# Patient Record
Sex: MALE | Race: BLACK OR AFRICAN AMERICAN | NOT HISPANIC OR LATINO | Employment: FULL TIME | URBAN - METROPOLITAN AREA
[De-identification: names, ages, dates, MRNs, and addresses within clinical notes are randomized per-mention and may not be internally consistent; named-entity substitution may affect disease eponyms.]

---

## 2017-06-06 ENCOUNTER — THERAPY VISIT (OUTPATIENT)
Dept: PHYSICAL THERAPY | Facility: CLINIC | Age: 57
End: 2017-06-06
Payer: OTHER MISCELLANEOUS

## 2017-06-06 DIAGNOSIS — M76.822 POSTERIOR TIBIAL TENDON DYSFUNCTION (PTTD) OF LEFT LOWER EXTREMITY: Primary | ICD-10-CM

## 2017-06-06 PROCEDURE — 97035 APP MDLTY 1+ULTRASOUND EA 15: CPT | Mod: GP | Performed by: PHYSICAL THERAPIST

## 2017-06-06 PROCEDURE — 97110 THERAPEUTIC EXERCISES: CPT | Mod: GP | Performed by: PHYSICAL THERAPIST

## 2017-06-06 PROCEDURE — 97161 PT EVAL LOW COMPLEX 20 MIN: CPT | Mod: GP | Performed by: PHYSICAL THERAPIST

## 2017-06-06 ASSESSMENT — ACTIVITIES OF DAILY LIVING (ADL)
HOW_WOULD_YOU_RATE_THE_OVERALL_FUNCTION_OF_YOUR_KNEE_DURING_YOUR_USUAL_DAILY_ACTIVITIES?: ABNORMAL
RISE FROM A CHAIR: ACTIVITY IS VERY DIFFICULT
SWELLING: THE SYMPTOM AFFECTS MY ACTIVITY SEVERELY
STAND: ACTIVITY IS FAIRLY DIFFICULT
KNEEL ON THE FRONT OF YOUR KNEE: ACTIVITY IS VERY DIFFICULT
GO DOWN STAIRS: ACTIVITY IS VERY DIFFICULT
HOW_WOULD_YOU_RATE_THE_CURRENT_FUNCTION_OF_YOUR_KNEE_DURING_YOUR_USUAL_DAILY_ACTIVITIES_ON_A_SCALE_FROM_0_TO_100_WITH_100_BEING_YOUR_LEVEL_OF_KNEE_FUNCTION_PRIOR_TO_YOUR_INJURY_AND_0_BEING_THE_INABILITY_TO_PERFORM_ANY_OF_YOUR_USUAL_DAILY_ACTIVITIES?: 20
AS_A_RESULT_OF_YOUR_KNEE_INJURY,_HOW_WOULD_YOU_RATE_YOUR_CURRENT_LEVEL_OF_DAILY_ACTIVITY?: ABNORMAL
SIT WITH YOUR KNEE BENT: ACTIVITY IS VERY DIFFICULT
WEAKNESS: NOT ANSWERED
STIFFNESS: THE SYMPTOM AFFECTS MY ACTIVITY MODERATELY
WALK: ACTIVITY IS FAIRLY DIFFICULT
GIVING WAY, BUCKLING OR SHIFTING OF KNEE: I DO NOT HAVE THE SYMPTOM
SQUAT: ACTIVITY IS VERY DIFFICULT
GO UP STAIRS: ACTIVITY IS VERY DIFFICULT
LIMPING: THE SYMPTOM AFFECTS MY ACTIVITY SLIGHTLY

## 2017-06-06 NOTE — PROGRESS NOTES
Subjective:    Patient is a 56 year old male presenting with rehab left ankle/foot hpi.   Aleks Gilmore is a 56 year old male with a left foot and left ankle condition.  Occurance: CARRYING MAIL IN 2013.  FOLLOWED BY SURGERY.   Condition occurred: at work.  This is a chronic condition  12/27/2013.     DP: 5/30/2017    WEARING FOOT ORTHOSES AND NEW SHOES AND THIS IS HELPING. .    Patient reports pain:  Medial.    Pain is described as aching and is intermittent and reported as 7/10.  Associated symptoms:  Buckling/giving out, tingling, loss of motion/stiffness and loss of strength. Pain is worse in the P.M..  Symptoms are exacerbated by ascending stairs, descending stairs, transfers and walking Relieved by: NEW SHOES, FOOT ORTHOSES.   Pain course: WAS WORSE, NOW IMPROVED.   Special tests:  MRI.  Previous treatment includes physical therapy.  There was no improvement following previous treatment.  General health as reported by patient is good.  Pertinent medical history includes:  Osteoarthritis, cancer, overweight, implanted device and history of fractures.    Other surgeries include:  Orthopedic surgery, other and cancer surgery (PROSTATE).  Current medications:  Anti-inflammatory.  Current occupation is  [employer]. .  Patient is working in normal job without restrictions.  Primary job tasks include:  Prolonged standing, lifting and repetitive tasks.    Barriers include:  None as reported by patient.    Red flags:  None as reported by patient.                        Objective:    System    Ankle/Foot Evaluation  ROM:        Strength:      Plantarflexion: Left: 3+/5   Pain:   Right: 5/5  Pain:  Inversion:Left: 4+/5  Pain:     Right: 5/5  Pain:  Eversion:Left: 5/5  Pain:  Right: 5/5  Pain:        Posterior Tibialis: Left: 4+/5  Pain:  Right: 5/5  Pain:  Peroneals: Left: 5/5  Pain:  Right: 5/5  Pain:        LIGAMENT TESTING:   Anterior Drawer (ATF) Left: neg   Anterior Drawer (ATF) Right: neg  Posterior  Drawer (PTF) Left: neg   Posterior Drawer (PTF) Right: neg  Varus Stress (Calc Fib) Left: neg    Varus Stress (Calc Fib) Right: neg  Valgus Stress (Deltoid) Left: neg    Valgus Stress (Deltoid) Right: neg  Rotation (Deltoid) Left: neg      External Rotation (High Ankle) Left: neg         PALPATION:   Left ankle tenderness present at:  posterior tibialis  Right ankle tenderness present at:   posterior tibialis                                                        General     ROS    Assessment/Plan:      Patient is a 56 year old male with left side ankle complaints.    Patient has the following significant findings with corresponding treatment plan.                Diagnosis 1:  POSTERIOR TIBIALIS TENDON DYSFUNCTION, INSERTIONAL LEFT.   Pain -  hot/cold therapy, US, electric stimulation, manual therapy, splint/taping/bracing/orthotics, self management, education, directional preference exercise and home program  Decreased strength - therapeutic exercise, therapeutic activities and home program  Decreased function - therapeutic activities and home program    Therapy Evaluation Codes:   1) History comprised of:   Personal factors that impact the plan of care:      None.    Comorbidity factors that impact the plan of care are:      Overweight and HISTORY OF SURGERY TO SITE. .     Medications impacting care: None.  2) Examination of Body Systems comprised of:   Body structures and functions that impact the plan of care:      Ankle, Hip and Knee.   Activity limitations that impact the plan of care are:      Stairs, Walking and Working.  3) Clinical presentation characteristics are:   Stable/Uncomplicated.  4) Decision-Making    Low complexity using standardized patient assessment instrument and/or measureable assessment of functional outcome.  Cumulative Therapy Evaluation is: Low complexity.    Previous and current functional limitations:  (See Goal Flow Sheet for this information)    Short term and Long term goals: (See  Goal Flow Sheet for this information)     Communication ability:  Patient appears to be able to clearly communicate and understand verbal and written communication and follow directions correctly.  Treatment Explanation - The following has been discussed with the patient:   RX ordered/plan of care  Anticipated outcomes  Possible risks and side effects  This patient would benefit from PT intervention to resume normal activities.   Rehab potential is fair.    Frequency:  1 X week, once daily  Duration:  for 6  E+T weeks  Discharge Plan:  Achieve all LTG.  Independent in home treatment program.  Reach maximal therapeutic benefit.    Please refer to the daily flowsheet for treatment today, total treatment time and time spent performing 1:1 timed codes.

## 2017-06-06 NOTE — MR AVS SNAPSHOT
"              After Visit Summary   2017    Aleks Gilmore    MRN: [id number]           Patient Information     Date Of Birth          1960        Visit Information        Provider Department      2017 2:40 PM Stone Rucker PT [facility]        Today's Diagnoses     Posterior tibial tendon dysfunction (PTTD) of left lower extremity    -  1       Follow-ups after your visit        Who to contact     If you have questions or need follow up information about today's clinic visit or your schedule please contact [facility] directly at [phone].  Normal or non-critical lab and imaging results will be communicated to you by [employer], letter or phone within 4 business days after the clinic has received the results. If you do not hear from us within 7 days, please contact the clinic through [employer] or phone. If you have a critical or abnormal lab result, we will notify you by phone as soon as possible.  Submit refill requests through [employer] or call your pharmacy and they will forward the refill request to us. Please allow 3 business days for your refill to be completed.          Additional Information About Your Visit        MyChart Information     [employer]bler lets you send messages to your doctor, view your test results, renew your prescriptions, schedule appointments and more. To sign up, go to [email or web address] . Click on \"Log in\" on the left side of the screen, which will take you to the Welcome page. Then click on \"Sign up Now\" on the right side of the page.     You will be asked to enter the access code listed below, as well as some personal information. Please follow the directions to create your username and password.     Your access code is: [email or web address]  Expires: 2017  9:52 PM     Your access code will  in 90 days. If you need help or a new code, please call your [place] clinic or [phone].        Care " EveryWhere ID     This is your Care EveryWhere ID. This could be used by other organizations to access your [place] medical records  [id number]         Blood Pressure from Last 3 Encounters:   No data found for BP    Weight from Last 3 Encounters:   No data found for Wt              We Performed the Following     PABLITO Inital Eval Report     PT Eval, Low Complexity (50205)     Therapeutic Exercises     Ultrasound Therapy        Primary Care Provider    None Specified       No primary provider on file.        Thank you!     Thank you for choosing [place] [facility]  for your care. Our goal is always to provide you with excellent care. Hearing back from our patients is one way we can continue to improve our services. Please take a few minutes to complete the written survey that you may receive in the mail after your visit with us. Thank you!             Your Updated Medication List - Protect others around you: Learn how to safely use, store and throw away your medicines at www.disposemymeds.org.      Notice  As of 6/6/2017 11:59 PM    You have not been prescribed any medications.

## 2017-06-07 PROBLEM — M76.822 POSTERIOR TIBIAL TENDON DYSFUNCTION (PTTD) OF LEFT LOWER EXTREMITY: Status: ACTIVE | Noted: 2017-06-07

## 2017-06-12 ENCOUNTER — THERAPY VISIT (OUTPATIENT)
Dept: PHYSICAL THERAPY | Facility: CLINIC | Age: 57
End: 2017-06-12
Payer: OTHER MISCELLANEOUS

## 2017-06-12 DIAGNOSIS — M76.822 POSTERIOR TIBIAL TENDON DYSFUNCTION (PTTD) OF LEFT LOWER EXTREMITY: ICD-10-CM

## 2017-06-12 PROCEDURE — 97112 NEUROMUSCULAR REEDUCATION: CPT | Mod: GP | Performed by: PHYSICAL THERAPIST

## 2017-06-12 PROCEDURE — 97110 THERAPEUTIC EXERCISES: CPT | Mod: GP | Performed by: PHYSICAL THERAPIST

## 2017-06-12 PROCEDURE — 97035 APP MDLTY 1+ULTRASOUND EA 15: CPT | Mod: GP | Performed by: PHYSICAL THERAPIST

## 2017-06-12 NOTE — PROGRESS NOTES
Subjective:    HPI                    Objective:    System    Physical Exam    General     ROS    Assessment/Plan:      SUBJECTIVE  Subjective changes as noted by pt:  Pt was seen for initial eval at [facility].  He reports that he got 1 day of relief from US and exercises.  He has been using old orthotics and feels that this has also been helpful as he waits for his new orthotics to come.       Current pain level:  Current Pain level: 4/10   Changes in function:  None     Adverse reaction to treatment or activity:  None    OBJECTIVE  Changes in objective findings:  Pt tolerated ankle strengthening with good tolerance.  Continued with US to posterior tibialis.  Added stretching and proprioceptive training.        ASSESSMENT  Aleks continues to require intervention to meet STG and LTG's: PT  Patient is progressing as expected.  Progress made towards STG/LTG?  None    PLAN  Current treatment program is being advanced to more complex exercises.    PTA/ATC plan:  N/A    Please refer to the daily flowsheet for treatment today, total treatment time and time spent performing 1:1 timed codes.

## 2017-06-12 NOTE — MR AVS SNAPSHOT
"              After Visit Summary   2017    Aleks Gilmore    MRN: [id number]           Patient Information     Date Of Birth          1960        Visit Information        Provider Department      2017 11:20 AM Obie Mota PT [facility]        Today's Diagnoses     Posterior tibial tendon dysfunction (PTTD) of left lower extremity           Follow-ups after your visit        Who to contact     If you have questions or need follow up information about today's clinic visit or your schedule please contact [facility] directly at [phone].  Normal or non-critical lab and imaging results will be communicated to you by "[employer]hart, letter or phone within 4 business days after the clinic has received the results. If you do not hear from us within 7 days, please contact the clinic through "[employer]hart or phone. If you have a critical or abnormal lab result, we will notify you by phone as soon as possible.  Submit refill requests through [place] [employer] or call your pharmacy and they will forward the refill request to us. Please allow 3 business days for your refill to be completed.          Additional Information About Your Visit        MyChart Information     [place] [employer] lets you send messages to your doctor, view your test results, renew your prescriptions, schedule appointments and more. To sign up, go to [email or web address] . Click on \"Log in\" on the left side of the screen, which will take you to the Welcome page. Then click on \"Sign up Now\" on the right side of the page.     You will be asked to enter the access code listed below, as well as some personal information. Please follow the directions to create your username and password.     Your access code is: [email or web address]  Expires: 2017  9:52 PM     Your access code will  in 90 days. If you need help or a new code, please call your [place] " clinic or [phone].        Care EveryWhere ID     This is your Care EveryWhere ID. This could be used by other organizations to access your [place] medical records  [id number]         Blood Pressure from Last 3 Encounters:   No data found for BP    Weight from Last 3 Encounters:   No data found for Wt              We Performed the Following     NEUROMUSCULAR RE-EDUCATION     THERAPEUTIC EXERCISES     ULTRASOUND THERAPY        Primary Care Provider    None Specified       No primary provider on file.        Thank you!     Thank you for choosing [place] [facility] PHYSICAL [facility]  for your care. Our goal is always to provide you with excellent care. Hearing back from our patients is one way we can continue to improve our services. Please take a few minutes to complete the written survey that you may receive in the mail after your visit with us. Thank you!             Your Updated Medication List - Protect others around you: Learn how to safely use, store and throw away your medicines at www.disposemymeds.org.      Notice  As of 6/12/2017  4:44 PM    You have not been prescribed any medications.

## 2017-06-21 ENCOUNTER — THERAPY VISIT (OUTPATIENT)
Dept: PHYSICAL THERAPY | Facility: CLINIC | Age: 57
End: 2017-06-21
Payer: OTHER MISCELLANEOUS

## 2017-06-21 DIAGNOSIS — M76.822 POSTERIOR TIBIAL TENDON DYSFUNCTION (PTTD) OF LEFT LOWER EXTREMITY: ICD-10-CM

## 2017-06-21 PROCEDURE — 97112 NEUROMUSCULAR REEDUCATION: CPT | Mod: GP | Performed by: PHYSICAL THERAPIST

## 2017-06-21 PROCEDURE — 97035 APP MDLTY 1+ULTRASOUND EA 15: CPT | Mod: GP | Performed by: PHYSICAL THERAPIST

## 2017-06-21 PROCEDURE — 97110 THERAPEUTIC EXERCISES: CPT | Mod: GP | Performed by: PHYSICAL THERAPIST

## 2017-06-21 NOTE — MR AVS SNAPSHOT
After Visit Summary   6/21/2017    Aleks Gilmore    MRN: [id number]           Patient Information     Date Of Birth          1960        Visit Information        Provider Department      6/21/2017 2:40 PM Obie Mota, PT [facility]        Today's Diagnoses     Posterior tibial tendon dysfunction (PTTD) of left lower extremity           Follow-ups after your visit        Your next 10 appointments already scheduled     Jun 27, 2017  2:30 PM CDT   PABLITO Extremity with Jean Marie Cooley [facility] Physical Therapy ([facility]  )    [address]  [place] [address]   [phone]            Jul 05, 2017  2:30 PM CDT   PABLITO Extremity with Jean Marie Cooley [facility] Physical Therapy ([facility]  )    [address]  [place] [address]   [phone]            Jul 12, 2017  2:40 PM CDT   PABLITO Extremity with Obie Mota, MARTÍN   [facility] Physical Therapy ([facility]  )    [address]  [place] [address]   [phone]              Who to contact     If you have questions or need follow up information about today's clinic visit or your schedule please contact [facility] PHYSICAL THERAPY directly at [phone].  Normal or non-critical lab and imaging results will be communicated to you by MyChart, letter or phone within 4 business days after the clinic has received the results. If you do not hear from us within 7 days, please contact the clinic through MyChart or phone. If you have a critical or abnormal lab result, we will notify you by phone as soon as possible.  Submit refill requests through [employer] or call your pharmacy and they will forward the refill request to us. Please allow 3 business  "days for your refill to be completed.          Additional Information About Your Visit        MyChart Information     [employer] lets you send messages to your doctor, view your test results, renew your prescriptions, schedule appointments and more. To sign up, go to [email or web address] . Click on \"Log in\" on the left side of the screen, which will take you to the Welcome page. Then click on \"Sign up Now\" on the right side of the page.     You will be asked to enter the access code listed below, as well as some personal information. Please follow the directions to create your username and password.     Your access code is: [email or web address]  Expires: 2017  9:52 PM     Your access code will  in 90 days. If you need help or a new code, please call your [place] clinic or [phone].        Care EveryWhere ID     This is your Care EveryWhere ID. This could be used by other organizations to access your [place] medical records  [id number]         Blood Pressure from Last 3 Encounters:   No data found for BP    Weight from Last 3 Encounters:   No data found for Wt              We Performed the Following     NEUROMUSCULAR RE-EDUCATION     THERAPEUTIC EXERCISES     ULTRASOUND THERAPY        Primary Care Provider    None Specified       No primary provider on file.        Equal Access to Services     ELZBIETA PEDERSON : Sunil Reich, amadou macario, jenn aiken, darcie dubose . So [facility] [phone].    ATENCIÓN: Si habla español, tiene a sutton disposición servicios gratuitos de asistencia lingüística. Llame al [phone].    We comply with applicable federal civil rights laws and [place] laws. We do not discriminate on the basis of race, color, national origin, age, disability sex, sexual orientation or gender identity.            Thank you!     Thank you for choosing [facility] THER[facility]  for your care. Our goal is " always to provide you with excellent care. Hearing back from our patients is one way we can continue to improve our services. Please take a few minutes to complete the written survey that you may receive in the mail after your visit with us. Thank you!             Your Updated Medication List - Protect others around you: Learn how to safely use, store and throw away your medicines at www.disposemymeds.org.      Notice  As of 6/21/2017 11:59 PM    You have not been prescribed any medications.

## 2017-06-26 NOTE — PROGRESS NOTES
Subjective:    HPI                    Objective:    System    Physical Exam    General     ROS    Assessment/Plan:      SUBJECTIVE  Subjective changes as noted by pt:  Pt reports that he continues to experience soreness and weakness.  He is attempting to to a little more walking/standing.     Current pain level:  Current Pain level: 4/10   Changes in function:  None     Adverse reaction to treatment or activity:  None    OBJECTIVE  Changes in objective findings:  Pt is improving with single leg stance tolerance.  Gait:  Decreased heel strike and push off persists.        ASSESSMENT  Aleks continues to require intervention to meet STG and LTG's: PT  Patient is progressing as expected.  Progress made towards STG/LTG?  None    PLAN  Continue current treatment plan until patient demonstrates readiness to progress to higher level exercises.    PTA/ATC plan:  N/A    Please refer to the daily flowsheet for treatment today, total treatment time and time spent performing 1:1 timed codes.

## 2017-06-27 ENCOUNTER — THERAPY VISIT (OUTPATIENT)
Dept: PHYSICAL THERAPY | Facility: CLINIC | Age: 57
End: 2017-06-27
Payer: OTHER MISCELLANEOUS

## 2017-06-27 DIAGNOSIS — M76.822 POSTERIOR TIBIAL TENDON DYSFUNCTION (PTTD) OF LEFT LOWER EXTREMITY: ICD-10-CM

## 2017-06-27 PROCEDURE — 97035 APP MDLTY 1+ULTRASOUND EA 15: CPT | Mod: GP | Performed by: SPECIALIST/TECHNOLOGIST

## 2017-06-27 PROCEDURE — 97110 THERAPEUTIC EXERCISES: CPT | Mod: GP | Performed by: SPECIALIST/TECHNOLOGIST

## 2017-06-27 PROCEDURE — 97112 NEUROMUSCULAR REEDUCATION: CPT | Mod: GP | Performed by: SPECIALIST/TECHNOLOGIST

## 2017-07-05 ENCOUNTER — THERAPY VISIT (OUTPATIENT)
Dept: PHYSICAL THERAPY | Facility: CLINIC | Age: 57
End: 2017-07-05
Payer: OTHER MISCELLANEOUS

## 2017-07-05 DIAGNOSIS — M76.822 POSTERIOR TIBIAL TENDON DYSFUNCTION (PTTD) OF LEFT LOWER EXTREMITY: ICD-10-CM

## 2017-07-05 PROCEDURE — 97110 THERAPEUTIC EXERCISES: CPT | Mod: GP | Performed by: SPECIALIST/TECHNOLOGIST

## 2017-07-05 PROCEDURE — 97112 NEUROMUSCULAR REEDUCATION: CPT | Mod: GP | Performed by: SPECIALIST/TECHNOLOGIST

## 2017-07-05 PROCEDURE — 97035 APP MDLTY 1+ULTRASOUND EA 15: CPT | Mod: GP | Performed by: SPECIALIST/TECHNOLOGIST

## 2017-07-12 ENCOUNTER — THERAPY VISIT (OUTPATIENT)
Dept: PHYSICAL THERAPY | Facility: CLINIC | Age: 57
End: 2017-07-12
Payer: OTHER MISCELLANEOUS

## 2017-07-12 DIAGNOSIS — M76.822 POSTERIOR TIBIAL TENDON DYSFUNCTION (PTTD) OF LEFT LOWER EXTREMITY: ICD-10-CM

## 2017-07-12 PROCEDURE — 97110 THERAPEUTIC EXERCISES: CPT | Mod: GP | Performed by: PHYSICAL THERAPIST

## 2017-07-12 NOTE — LETTER
[facility] PHYSICAL THERAPY  [address]  [place] [address]  [phone]    2017    Re: Aleks Gilmore   :   1960  MRN:  [id number]   REFERRING PHYSICIAN:   Alex Gatica    [facility] PHYSICAL THERAPY    Date of Initial Evaluation:  2017  Visits:  Rxs Used: 6  Reason for Referral:  Posterior tibial tendon dysfunction (PTTD) of left lower extremity    EVALUATION SUMMARY    PROGRESS  REPORT  Progress reporting period is from 2017 to 2017.       SUBJECTIVE  Subjective changes noted by patient:  Pt reports that his left ankle is feeling much better.  He is riding his bike 10 miles 3-5x/week.  He notes that he is working 10 hour days and tolerates this well (fatigue but not pain at the end of the day).   He notes that he is averaging >10,000 steps on his activity tracker. He rates his overall progress at 85%        Current pain level is  Current Pain level:  (0-1/10).     Previous pain level was   Initial Pain level: 7/10.   Changes in function:  Yes (See Goal flowsheet attached for changes in current functional level)  Adverse reaction to treatment or activity: None  OBJECTIVE  Changes noted in objective findings:  Left (right) Ankle AROM:  Dorsiflexion 10 (12), Plantarflexion 36 (38), Inversion 32 (38), Eversion 12 (16).   Left ankle strength:  Dorsiflexion 5-/5, Plantarflexion 4+/5, Inversion 5-/5, Eversion 5/5.  Pt is performing a HEP that includes ROM, strengthening and proprioceptive training with good tolerance.   ASSESSMENT/PLAN  Updated problem list and treatment plan: Diagnosis 1:  Left ankle pain     STG/LTGs have been met or progress has been made towards goals:  Yes (See Goal flow sheet completed today.)  Assessment of Progress: The patient's condition is improving.  Self Management Plans:  Patient has been instructed in a home treatment program.  Aleks continues to  require the following intervention to meet STG and LTG's:  PT intervention is no longer required to meet STG/LTG.    Recommendations:  This patient is ready to be discharged from therapy and continue their home treatment program.                  Thank you for your referral.    INQUIRIES  Therapist: Obie Mota, [facility]   [facility]  [address]  [place] [address]  Phone: [phone]  Fax: [phone]

## 2017-07-12 NOTE — MR AVS SNAPSHOT
"              After Visit Summary   2017    Aleks Gilmore    MRN: [id number]           Patient Information     Date Of Birth          1960        Visit Information        Provider Department      2017 2:40 PM Obie Mota PT [facility] Athletic [facility] Physical [facility]        Today's Diagnoses     Posterior tibial tendon dysfunction (PTTD) of left lower extremity           Follow-ups after your visit        Who to contact     If you have questions or need follow up information about today's clinic visit or your schedule please contact [facility] ATHLETIC [facility] PHYSICAL [facility] directly at [phone].  Normal or non-critical lab and imaging results will be communicated to you by Open Mobile Solutionshart, letter or phone within 4 business days after the clinic has received the results. If you do not hear from us within 7 days, please contact the clinic through Open Mobile [employer]hart or phone. If you have a critical or abnormal lab result, we will notify you by phone as soon as possible.  Submit refill requests through Tinybeans or call your pharmacy and they will forward the refill request to us. Please allow 3 business days for your refill to be completed.          Additional Information About Your Visit        MyChart Information     Tinybeans lets you send messages to your doctor, view your test results, renew your prescriptions, schedule appointments and more. To sign up, go to [email or web address] . Click on \"Log in\" on the left side of the screen, which will take you to the Welcome page. Then click on \"Sign up Now\" on the right side of the page.     You will be asked to enter the access code listed below, as well as some personal information. Please follow the directions to create your username and password.     Your access code is: [email or web address]  Expires: 2017  9:52 PM     Your access code will  in 90 days. If you need help or a new code, please call your [place] " clinic or [phone].        Care EveryWhere ID     This is your Care EveryWhere ID. This could be used by other organizations to access your [place] medical records  [id number]         Blood Pressure from Last 3 Encounters:   No data found for BP    Weight from Last 3 Encounters:   No data found for Wt              We Performed the Following     PABLITO PROGRESS NOTES REPORT     THERAPEUTIC EXERCISES        Primary Care Provider    None Specified       No primary provider on file.        Equal Access to Services     [facility]: Hadii aad ku hadasho Soomaali, waaxda luqadaha, qaybta kaalmada adeegyada, waxay idiin hayaan elmer schroedermarshasilvano laruth ann . So [facility] [phone].    ATENCIÓN: Si habla español, tiene a sutton disposición servicios gratuitos de asistencia lingüística. Cristobalame al [phone].    We comply with applicable federal civil rights laws and [place] laws. We do not discriminate on the basis of race, color, national origin, age, disability sex, sexual orientation or gender identity.            Thank you!     Thank you for [facility] FOR ATHLETIC MEDICINE [facility] PHYSICAL [facility]  for your care. Our goal is always to provide you with excellent care. Hearing back from our patients is one way we can continue to improve our services. Please take a few minutes to complete the written survey that you may receive in the mail after your visit with us. Thank you!             Your Updated Medication List - Protect others around you: Learn how to safely use, store and throw away your medicines at www.disposemymeds.org.      Notice  As of 7/12/2017  3:29 PM    You have not been prescribed any medications.

## 2017-07-12 NOTE — PROGRESS NOTES
Subjective:    HPI                    Objective:    System    Physical Exam    General     ROS    Assessment/Plan:      PROGRESS  REPORT    Progress reporting period is from 6-6-2017 to 7-.       SUBJECTIVE  Subjective changes noted by patient:  Pt reports that his left ankle is feeling much better.  He is riding his bike 10 miles 3-5x/week.  He notes that he is working 10 hour days and tolerates this well (fatigue but not pain at the end of the day).   He notes that he is averaging >10,000 steps on his activity tracker. He rates his overall progress at 85%        Current pain level is  Current Pain level:  (0-1/10).     Previous pain level was   Initial Pain level: 7/10.   Changes in function:  Yes (See Goal flowsheet attached for changes in current functional level)  Adverse reaction to treatment or activity: None    OBJECTIVE  Changes noted in objective findings:  Left (right) Ankle AROM:  Dorsiflexion 10 (12), Plantarflexion 36 (38), Inversion 32 (38), Eversion 12 (16).   Left ankle strength:  Dorsiflexion 5-/5, Plantarflexion 4+/5, Inversion 5-/5, Eversion 5/5.  Pt is performing a HEP that includes ROM, strengthening and proprioceptive training with good tolerance.         ASSESSMENT/PLAN  Updated problem list and treatment plan: Diagnosis 1:  Left ankle pain     STG/LTGs have been met or progress has been made towards goals:  Yes (See Goal flow sheet completed today.)  Assessment of Progress: The patient's condition is improving.  Self Management Plans:  Patient has been instructed in a home treatment program.  Aleks continues to require the following intervention to meet STG and LTG's:  PT intervention is no longer required to meet STG/LTG.    Recommendations:  This patient is ready to be discharged from therapy and continue their home treatment program.    Please refer to the daily flowsheet for treatment today, total treatment time and time spent performing 1:1 timed codes.

## 2017-12-15 ENCOUNTER — TELEPHONE (OUTPATIENT)
Dept: PHYSICAL THERAPY | Facility: CLINIC | Age: 57
End: 2017-12-15

## 2017-12-15 NOTE — PATIENT INSTRUCTIONS
Left VM for Hema Sears to ask for 6 more visits for WC Claim [id number] for Left Foot per new add orders from [facility] ESQUIVEL. Will update once decision is recvd. MCE

## 2017-12-20 ENCOUNTER — THERAPY VISIT (OUTPATIENT)
Dept: PHYSICAL THERAPY | Facility: CLINIC | Age: 57
End: 2017-12-20
Payer: OTHER MISCELLANEOUS

## 2017-12-20 DIAGNOSIS — M25.572 CHRONIC PAIN OF LEFT ANKLE: Primary | ICD-10-CM

## 2017-12-20 DIAGNOSIS — G89.29 CHRONIC PAIN OF LEFT ANKLE: Primary | ICD-10-CM

## 2017-12-20 PROCEDURE — 97161 PT EVAL LOW COMPLEX 20 MIN: CPT | Mod: GP | Performed by: PHYSICAL THERAPIST

## 2017-12-20 PROCEDURE — 97110 THERAPEUTIC EXERCISES: CPT | Mod: GP | Performed by: PHYSICAL THERAPIST

## 2017-12-20 PROCEDURE — 97035 APP MDLTY 1+ULTRASOUND EA 15: CPT | Mod: GP | Performed by: PHYSICAL THERAPIST

## 2017-12-20 NOTE — MR AVS SNAPSHOT
"              After Visit Summary   2017    Aleks Gilmore    MRN: [id number]           Patient Information     Date Of Birth          1960        Visit Information        Provider Department      2017 7:30 AM Obie Mota PT [facility] Athletic [facility] Physical [facility]        Today's Diagnoses     Chronic pain of left ankle    -  1       Follow-ups after your visit        Who to contact     If you have questions or need follow up information about today's clinic visit or your schedule please contact [facility] PH[facility] directly at [phone].  Normal or non-critical lab and imaging results will be communicated to you by ServiceMeshhart, letter or phone within 4 business days after the clinic has received the results. If you do not hear from us within 7 days, please contact the clinic through ServiceMeshhart or phone. If you have a critical or abnormal lab result, we will notify you by phone as soon as possible.  Submit refill requests through Reclamador or call your pharmacy and they will forward the refill request to us. Please allow 3 business days for your refill to be completed.          Additional Information About Your Visit        MyChart Information     Reclamador lets you send messages to your doctor, view your test results, renew your prescriptions, schedule appointments and more. To sign up, go to [email or web address] . Click on \"Log in\" on the left side of the screen, which will take you to the Welcome page. Then click on \"Sign up Now\" on the right side of the page.     You will be asked to enter the access code listed below, as well as some personal information. Please follow the directions to create your username and password.     Your access code is: [email or web address]  Expires: 3/20/2018  8:39 AM     Your access code will  in 90 days. If you need help or a new code, please call your [place] clinic or [phone].        Care " EveryWhere ID     This is your Care EveryWhere ID. This could be used by other organizations to access your [place] medical records  [id number]         Blood Pressure from Last 3 Encounters:   No data found for BP    Weight from Last 3 Encounters:   No data found for Wt              We Performed the Following     HC PT EVAL, LOW COMPLEXITY     PABLITO INITIAL EVAL REPORT     THERAPEUTIC EXERCISES     ULTRASOUND THERAPY        Primary Care Provider Office Phone # Fax #    Alex Gatica [phone]       [address] [facility]  [facility] [address]        Equal Access to Services     [facility]: Hadii aad ku hadasho Soomaali, waaxda luqadaha, qaybta kaalmada adeegyada, waxay hakanin haymontserratn elmer dubose . So [facility] [phone].    ATENCIÓN: Si habla español, tiene a sutton disposición servicios gratuitos de asistencia lingüística. [facility] [phone].    We comply with applicable federal civil rights laws and [place] laws. We do not discriminate on the basis of race, color, national origin, age, disability, sex, sexual orientation, or gender identity.            Thank you!     Thank you for choosing [facility] PHYSICAL THERAPY  for your care. Our goal is always to provide you with excellent care. Hearing back from our patients is one way we can continue to improve our services. Please take a few minutes to complete the written survey that you may receive in the mail after your visit with us. Thank you!             Your Updated Medication List - Protect others around you: Learn how to safely use, store and throw away your medicines at www.disposemymeds.org.      Notice  As of 12/20/2017  8:39 AM    You have not been prescribed any medications.

## 2017-12-20 NOTE — LETTER
[facility] ATHLETIC [facility] PHYSICAL THERAPY  [address]  [place] [address]  [phone]    2017    Re: Alkes Gilmore   :   1960  MRN:  [id number]   REFERRING PHYSICIAN:   Alex Gatica    [facility] ATHLETIC [facility] PHYSICAL THERAPY    Date of Initial Evaluation:  2017  Visits:  Rxs Used: 1  Reason for Referral:  Chronic pain of left ankle    EVALUATION SUMMARY    [facility] Athletic [facility] Evaluation    Subjective:    Patient is a 57 year old male presenting with rehab left ankle/foot hpi.   Aleks Gilmore is a 57 year old male with a left ankle condition.      This is a chronic condition  Pt presents to PT with complaints of left medial ankle pain.  He began having pain in the arch of his foot in .  He was working as a  at the time.  Pt had surgery on his left foot/ankle on 2016.  He had a procedure to his calf to improve his arch support.  He reports that he had relief for a period of time.  However he began having medial ankle pain from prolonged standing and walking as a .  He had PT in 2017 and had some relief but now symptoms have returned.  He recently saw his podiatrist who identified some additional soft tissue injury and is considering surgery to repair the tendon.  Pt would like to try PT to see if he can improve his ankle before proceeding with surgery.  He localizes the pain to the medial ankle.  He has increased pain with going up/down stairs and prolonged standing and walking.  He limits his standing to 15'.  He uses ibuprofen to manage pain.  .    Patient reports pain:  Medial.  Radiates to:  Lower leg.  Pain is described as aching and is constant and reported as 4/10.  Associated symptoms:  Numbness, loss of motion/stiffness and loss of strength. Pain is the same all the time.  Symptoms are exacerbated by standing, walking, ascending stairs and descending  stairs and relieved by NSAID's.  Since onset symptoms are gradually worsening.  Special tests:  MRI.      General health as reported by patient is good.      Other surgeries include:  Orthopedic surgery and cancer surgery (ankle; prostate).    Current occupation is assisted  Patient is working in normal job without restrictions.  Primary job tasks include:  Prolonged standing and repetitive tasks.  Barriers include:  None as reported by patient.  Red flags:  None as reported by patient.            Ankle/Foot Evaluation  ROM:    AROM:    Dorsiflexion:  Left:   4  Right:   10  Plantarflexion:  Left:  32    Right:  34  Inversion:  Left:  34     Right:  50  Eversion:  14     Right:  24  Strength:    Dorsiflexion:  Left: 5/5     Pain:     Plantarflexion: Left: 5-/5    Pain:+     Inversion:Left: 4/5   Pain:+       Eversion:Left: 5/5  Pain:    LIGAMENT TESTING:   Anterior Drawer (ATF) Left: neg     Posterior Drawer (PTF) Left: neg     Valgus Stress (Deltoid) Left: neg      PALPATION:   Left ankle tenderness present at:  posterior tibialis and deltoid ligament  Right ankle tenderness present at:   posterior tibialis  EDEMA: normal  MOBILITY TESTING: normal    Assessment/Plan:    Patient is a 57 year old male with left side ankle complaints.    Patient has the following significant findings with corresponding treatment plan.                Diagnosis 1:  Left ankle posterior tibialis tendinopathy  Pain -  hot/cold therapy, US, manual therapy, self management, education and home program  Decreased ROM/flexibility - manual therapy and therapeutic exercise  Decreased strength - therapeutic exercise and therapeutic activities  Decreased function - therapeutic activities  Therapy Evaluation Codes:   1) History comprised of:   Personal factors that impact the plan of care:      None.    Comorbidity factors that impact the plan of care are:      None.     Medications impacting care: None.  2) Examination of Body Systems comprised  of:   Body structures and functions that impact the plan of care:      Ankle.   Activity limitations that impact the plan of care are:      Stairs, Standing and Walking.  3) Clinical presentation characteristics are:   Stable/Uncomplicated.  4) Decision-Making    Low complexity using standardized patient assessment instrument and/or measureable assessment of functional outcome.  Cumulative Therapy Evaluation is: Low complexity.          Previous and current functional limitations:  (See Goal Flow Sheet for this information)    Short term and Long term goals: (See Goal Flow Sheet for this information)   Communication ability:  Patient appears to be able to clearly communicate and understand verbal and written communication and follow directions correctly.  Treatment Explanation - The following has been discussed with the patient:   RX ordered/plan of care  Anticipated outcomes  Possible risks and side effects  This patient would benefit from PT intervention to resume normal activities.   Rehab potential is good.  Frequency:  1 X week, once daily  Duration:  for 6 weeks  Discharge Plan:  Achieve all LTG.  Independent in home treatment program.  Reach maximal therapeutic benefit.              Thank you for your referral.    INQUIRIES  Therapist: Obie Mota, [facility]  [facility] PHYSICAL THERAPY  [address]  [place] [address]  Phone: [phone]  Fax: [phone]

## 2017-12-20 NOTE — PROGRESS NOTES
[place] for Athletic Medicine Evaluation  Subjective:    Patient is a 57 year old male presenting with rehab left ankle/foot hpi.   Aleks Gilmore is a 57 year old male with a left ankle condition.      This is a chronic condition  Pt presents to PT with complaints of left medial ankle pain.  He began having pain in the arch of his foot in 2013.  He was working as a  at the time.  Pt had surgery on his left foot/ankle on 2-.  He had a procedure to his calf to improve his arch support.  He reports that he had relief for a period of time.  However he began having medial ankle pain from prolonged standing and walking as a .  He had PT in June 2017 and had some relief but now symptoms have returned.  He recently saw his podiatrist who identified some additional soft tissue injury and is considering surgery to repair the tendon.  Pt would like to try PT to see if he can improve his ankle before proceeding with surgery.  He localizes the pain to the medial ankle.  He has increased pain with going up/down stairs and prolonged standing and walking.  He limits his standing to 15'.  He uses ibuprofen to manage pain.  .    Patient reports pain:  Medial.  Radiates to:  Lower leg.  Pain is described as aching and is constant and reported as 4/10.  Associated symptoms:  Numbness, loss of motion/stiffness and loss of strength. Pain is the same all the time.  Symptoms are exacerbated by standing, walking, ascending stairs and descending stairs and relieved by NSAID's.  Since onset symptoms are gradually worsening.  Special tests:  MRI.      General health as reported by patient is good.      Other surgeries include:  Orthopedic surgery and cancer surgery (ankle; prostate).    Current occupation is retirement  .  Patient is working in normal job without restrictions.  Primary job tasks include:  Prolonged standing and repetitive tasks.    Barriers include:  None as reported by patient.    Red flags:   None as reported by patient.                        Objective:    System    Ankle/Foot Evaluation  ROM:    AROM:    Dorsiflexion:  Left:   4  Right:   10  Plantarflexion:  Left:  32    Right:  34  Inversion:  Left:  34     Right:  50  Eversion:  14     Right:  24        Strength:    Dorsiflexion:  Left: 5/5     Pain:     Plantarflexion: Left: 5-/5    Pain:+     Inversion:Left: 4/5   Pain:+       Eversion:Left: 5/5  Pain:                    LIGAMENT TESTING:   Anterior Drawer (ATF) Left: neg     Posterior Drawer (PTF) Left: neg       Valgus Stress (Deltoid) Left: neg            PALPATION:   Left ankle tenderness present at:  posterior tibialis and deltoid ligament  Right ankle tenderness present at:   posterior tibialis  EDEMA: normal          MOBILITY TESTING: normal                                                                General     ROS    Assessment/Plan:      Patient is a 57 year old male with left side ankle complaints.    Patient has the following significant findings with corresponding treatment plan.                Diagnosis 1:  Left ankle posterior tibialis tendinopathy    Pain -  hot/cold therapy, US, manual therapy, self management, education and home program  Decreased ROM/flexibility - manual therapy and therapeutic exercise  Decreased strength - therapeutic exercise and therapeutic activities  Decreased function - therapeutic activities    Therapy Evaluation Codes:   1) History comprised of:   Personal factors that impact the plan of care:      None.    Comorbidity factors that impact the plan of care are:      None.     Medications impacting care: None.  2) Examination of Body Systems comprised of:   Body structures and functions that impact the plan of care:      Ankle.   Activity limitations that impact the plan of care are:      Stairs, Standing and Walking.  3) Clinical presentation characteristics are:   Stable/Uncomplicated.  4) Decision-Making    Low complexity using standardized patient  assessment instrument and/or measureable assessment of functional outcome.  Cumulative Therapy Evaluation is: Low complexity.    Previous and current functional limitations:  (See Goal Flow Sheet for this information)    Short term and Long term goals: (See Goal Flow Sheet for this information)     Communication ability:  Patient appears to be able to clearly communicate and understand verbal and written communication and follow directions correctly.  Treatment Explanation - The following has been discussed with the patient:   RX ordered/plan of care  Anticipated outcomes  Possible risks and side effects  This patient would benefit from PT intervention to resume normal activities.   Rehab potential is good.    Frequency:  1 X week, once daily  Duration:  for 6 weeks  Discharge Plan:  Achieve all LTG.  Independent in home treatment program.  Reach maximal therapeutic benefit.    Please refer to the daily flowsheet for treatment today, total treatment time and time spent performing 1:1 timed codes.

## 2018-01-29 PROBLEM — M25.572 CHRONIC PAIN OF LEFT ANKLE: Status: RESOLVED | Noted: 2017-12-20 | Resolved: 2018-01-29

## 2018-01-29 PROBLEM — G89.29 CHRONIC PAIN OF LEFT ANKLE: Status: RESOLVED | Noted: 2017-12-20 | Resolved: 2018-01-29

## 2018-02-27 ENCOUNTER — RECORDS - HEALTHEAST (OUTPATIENT)
Dept: ADMINISTRATIVE | Facility: OTHER | Age: 58
End: 2018-02-27

## 2018-03-10 ENCOUNTER — RECORDS - HEALTHEAST (OUTPATIENT)
Dept: ADMINISTRATIVE | Facility: OTHER | Age: 58
End: 2018-03-10

## 2018-03-14 ENCOUNTER — RECORDS - HEALTHEAST (OUTPATIENT)
Dept: ADMINISTRATIVE | Facility: OTHER | Age: 58
End: 2018-03-14

## 2018-04-09 ENCOUNTER — RECORDS - HEALTHEAST (OUTPATIENT)
Dept: ADMINISTRATIVE | Facility: OTHER | Age: 58
End: 2018-04-09

## 2018-05-09 ENCOUNTER — RECORDS - HEALTHEAST (OUTPATIENT)
Dept: ADMINISTRATIVE | Facility: OTHER | Age: 58
End: 2018-05-09

## 2018-06-29 ENCOUNTER — RECORDS - HEALTHEAST (OUTPATIENT)
Dept: ADMINISTRATIVE | Facility: OTHER | Age: 58
End: 2018-06-29

## 2018-09-11 ENCOUNTER — RECORDS - HEALTHEAST (OUTPATIENT)
Dept: ADMINISTRATIVE | Facility: OTHER | Age: 58
End: 2018-09-11

## 2018-10-19 ENCOUNTER — RECORDS - HEALTHEAST (OUTPATIENT)
Dept: ADMINISTRATIVE | Facility: OTHER | Age: 58
End: 2018-10-19

## 2018-10-29 ENCOUNTER — RECORDS - HEALTHEAST (OUTPATIENT)
Dept: ADMINISTRATIVE | Facility: OTHER | Age: 58
End: 2018-10-29

## 2018-11-14 ENCOUNTER — RECORDS - HEALTHEAST (OUTPATIENT)
Dept: ADMINISTRATIVE | Facility: OTHER | Age: 58
End: 2018-11-14

## 2018-12-12 ENCOUNTER — RECORDS - HEALTHEAST (OUTPATIENT)
Dept: ADMINISTRATIVE | Facility: OTHER | Age: 58
End: 2018-12-12

## 2019-01-09 ENCOUNTER — RECORDS - HEALTHEAST (OUTPATIENT)
Dept: ADMINISTRATIVE | Facility: OTHER | Age: 59
End: 2019-01-09

## 2019-03-01 ENCOUNTER — RECORDS - HEALTHEAST (OUTPATIENT)
Dept: ADMINISTRATIVE | Facility: OTHER | Age: 59
End: 2019-03-01

## 2019-03-06 ENCOUNTER — RECORDS - HEALTHEAST (OUTPATIENT)
Dept: ADMINISTRATIVE | Facility: OTHER | Age: 59
End: 2019-03-06

## 2019-03-30 ENCOUNTER — RECORDS - HEALTHEAST (OUTPATIENT)
Dept: ADMINISTRATIVE | Facility: OTHER | Age: 59
End: 2019-03-30

## 2019-04-02 ENCOUNTER — RECORDS - HEALTHEAST (OUTPATIENT)
Dept: ADMINISTRATIVE | Facility: OTHER | Age: 59
End: 2019-04-02

## 2019-06-05 ENCOUNTER — RECORDS - HEALTHEAST (OUTPATIENT)
Dept: ADMINISTRATIVE | Facility: OTHER | Age: 59
End: 2019-06-05

## 2019-06-20 ENCOUNTER — RECORDS - HEALTHEAST (OUTPATIENT)
Dept: ADMINISTRATIVE | Facility: OTHER | Age: 59
End: 2019-06-20

## 2019-08-19 ENCOUNTER — RECORDS - HEALTHEAST (OUTPATIENT)
Dept: ADMINISTRATIVE | Facility: OTHER | Age: 59
End: 2019-08-19

## 2019-10-30 ENCOUNTER — RECORDS - HEALTHEAST (OUTPATIENT)
Dept: ADMINISTRATIVE | Facility: OTHER | Age: 59
End: 2019-10-30

## 2019-12-23 ENCOUNTER — AMBULATORY - HEALTHEAST (OUTPATIENT)
Dept: LAB | Facility: CLINIC | Age: 59
End: 2019-12-23

## 2019-12-23 ENCOUNTER — OFFICE VISIT - HEALTHEAST (OUTPATIENT)
Dept: SURGERY | Facility: CLINIC | Age: 59
End: 2019-12-23

## 2019-12-23 DIAGNOSIS — M17.11 PRIMARY OSTEOARTHRITIS OF RIGHT KNEE: ICD-10-CM

## 2019-12-23 DIAGNOSIS — I10 BENIGN ESSENTIAL HYPERTENSION: ICD-10-CM

## 2019-12-23 DIAGNOSIS — R06.83 SNORING: ICD-10-CM

## 2019-12-23 DIAGNOSIS — K21.9 GASTROESOPHAGEAL REFLUX DISEASE, ESOPHAGITIS PRESENCE NOT SPECIFIED: ICD-10-CM

## 2019-12-23 DIAGNOSIS — E66.01 OBESITY, CLASS III, BMI 40-49.9 (MORBID OBESITY) (H): ICD-10-CM

## 2019-12-23 DIAGNOSIS — Z85.46 HX OF PROSTATIC MALIGNANCY: ICD-10-CM

## 2019-12-23 LAB
FOLATE SERPL-MCNC: 8.7 NG/ML
PTH-INTACT SERPL-MCNC: 106 PG/ML (ref 10–86)
TSH SERPL DL<=0.005 MIU/L-ACNC: 1.28 UIU/ML (ref 0.3–5)
VIT B12 SERPL-MCNC: 556 PG/ML (ref 213–816)

## 2019-12-23 RX ORDER — IBUPROFEN 800 MG/1
800 TABLET, FILM COATED ORAL PRN
Status: SHIPPED | COMMUNITY
Start: 2019-11-27

## 2019-12-23 RX ORDER — OXYBUTYNIN CHLORIDE 10 MG/1
1 TABLET, EXTENDED RELEASE ORAL AT BEDTIME
Status: SHIPPED | COMMUNITY
Start: 2019-12-11

## 2019-12-23 RX ORDER — HYDROCODONE BITARTRATE AND ACETAMINOPHEN 10; 325 MG/1; MG/1
1 TABLET ORAL EVERY 8 HOURS PRN
Status: SHIPPED | COMMUNITY
Start: 2019-11-13

## 2019-12-23 RX ORDER — TRIAMCINOLONE ACETONIDE 1 MG/G
1 CREAM TOPICAL
Status: SHIPPED | COMMUNITY
Start: 2019-10-30

## 2019-12-23 RX ORDER — AMLODIPINE BESYLATE 10 MG/1
10 TABLET ORAL DAILY
Status: SHIPPED | COMMUNITY
Start: 2019-10-30

## 2019-12-23 ASSESSMENT — MIFFLIN-ST. JEOR: SCORE: 2323.12

## 2019-12-24 LAB — 25(OH)D3 SERPL-MCNC: 10.8 NG/ML (ref 30–80)

## 2019-12-26 LAB
ANNOTATION COMMENT IMP: NORMAL
COPPER SERPL-MCNC: 142.2 UG/DL (ref 70–140)
VIT A SERPL-MCNC: 0.43 MG/L (ref 0.3–1.2)
VIT B1 PYROPHOSHATE BLD-SCNC: 103 NMOL/L (ref 70–180)
VITAMIN A (RETINYL PALMITATE): 0.02 MG/L (ref 0–0.1)
ZINC SERPL-MCNC: 85.5 UG/DL (ref 60–120)

## 2019-12-27 ENCOUNTER — COMMUNICATION - HEALTHEAST (OUTPATIENT)
Dept: SURGERY | Facility: CLINIC | Age: 59
End: 2019-12-27

## 2019-12-27 ENCOUNTER — AMBULATORY - HEALTHEAST (OUTPATIENT)
Dept: SURGERY | Facility: CLINIC | Age: 59
End: 2019-12-27

## 2019-12-27 DIAGNOSIS — E55.9 VITAMIN D DEFICIENCY: ICD-10-CM

## 2019-12-27 DIAGNOSIS — E21.1 SECONDARY HYPERPARATHYROIDISM, NON-RENAL (H): ICD-10-CM

## 2020-01-03 ENCOUNTER — SURGERY - HEALTHEAST (OUTPATIENT)
Dept: SURGERY | Facility: CLINIC | Age: 60
End: 2020-01-03

## 2020-01-03 DIAGNOSIS — K21.9 ESOPHAGEAL REFLUX: ICD-10-CM

## 2020-01-03 DIAGNOSIS — E66.01 MORBID OBESITY (H): ICD-10-CM

## 2020-01-08 ENCOUNTER — OFFICE VISIT - HEALTHEAST (OUTPATIENT)
Dept: SURGERY | Facility: CLINIC | Age: 60
End: 2020-01-08

## 2020-01-08 DIAGNOSIS — I10 BENIGN ESSENTIAL HYPERTENSION: ICD-10-CM

## 2020-01-08 DIAGNOSIS — E66.01 OBESITY, CLASS III, BMI 40-49.9 (MORBID OBESITY) (H): ICD-10-CM

## 2020-01-08 ASSESSMENT — MIFFLIN-ST. JEOR: SCORE: 2304.52

## 2020-01-15 ASSESSMENT — MIFFLIN-ST. JEOR
SCORE: 2309.51
SCORE: 2309.51

## 2020-01-22 ENCOUNTER — HOSPITAL ENCOUNTER (OUTPATIENT)
Dept: SURGERY | Facility: AMBULATORY SURGERY CENTER | Age: 60
Discharge: HOME | End: 2020-01-22
Attending: SURGERY | Admitting: SURGERY

## 2020-01-22 ENCOUNTER — SURGERY - HEALTHEAST (OUTPATIENT)
Dept: SURGERY | Facility: AMBULATORY SURGERY CENTER | Age: 60
End: 2020-01-22

## 2020-01-22 DIAGNOSIS — E66.01 MORBID OBESITY (H): ICD-10-CM

## 2020-02-05 ENCOUNTER — OFFICE VISIT - HEALTHEAST (OUTPATIENT)
Dept: SURGERY | Facility: CLINIC | Age: 60
End: 2020-02-05

## 2020-02-05 ENCOUNTER — AMBULATORY - HEALTHEAST (OUTPATIENT)
Dept: SURGERY | Facility: CLINIC | Age: 60
End: 2020-02-05

## 2020-02-05 DIAGNOSIS — E66.01 MORBID OBESITY (H): ICD-10-CM

## 2020-02-05 DIAGNOSIS — E66.01 OBESITY, CLASS III, BMI 40-49.9 (MORBID OBESITY) (H): ICD-10-CM

## 2020-02-05 DIAGNOSIS — I10 BENIGN ESSENTIAL HYPERTENSION: ICD-10-CM

## 2020-02-05 ASSESSMENT — MIFFLIN-ST. JEOR: SCORE: 2291.82

## 2020-04-09 ENCOUNTER — OFFICE VISIT - HEALTHEAST (OUTPATIENT)
Dept: SURGERY | Facility: CLINIC | Age: 60
End: 2020-04-09

## 2020-04-09 DIAGNOSIS — E66.01 OBESITY, CLASS III, BMI 40-49.9 (MORBID OBESITY) (H): ICD-10-CM

## 2020-04-09 DIAGNOSIS — Z71.3 NUTRITIONAL COUNSELING: ICD-10-CM

## 2020-04-09 ASSESSMENT — MIFFLIN-ST. JEOR: SCORE: 2241.47

## 2020-04-16 ENCOUNTER — OFFICE VISIT - HEALTHEAST (OUTPATIENT)
Dept: SURGERY | Facility: CLINIC | Age: 60
End: 2020-04-16

## 2020-04-16 DIAGNOSIS — E66.01 MORBID OBESITY (H): ICD-10-CM

## 2020-05-13 ENCOUNTER — OFFICE VISIT - HEALTHEAST (OUTPATIENT)
Dept: SURGERY | Facility: CLINIC | Age: 60
End: 2020-05-13

## 2020-05-13 DIAGNOSIS — E66.01 MORBID OBESITY (H): ICD-10-CM

## 2020-05-28 ENCOUNTER — COMMUNICATION - HEALTHEAST (OUTPATIENT)
Dept: SURGERY | Facility: CLINIC | Age: 60
End: 2020-05-28

## 2020-05-28 ENCOUNTER — OFFICE VISIT - HEALTHEAST (OUTPATIENT)
Dept: SURGERY | Facility: CLINIC | Age: 60
End: 2020-05-28

## 2020-05-28 DIAGNOSIS — I10 BENIGN ESSENTIAL HYPERTENSION: ICD-10-CM

## 2020-05-28 DIAGNOSIS — E66.01 OBESITY, CLASS III, BMI 40-49.9 (MORBID OBESITY) (H): ICD-10-CM

## 2020-05-28 DIAGNOSIS — Z71.3 NUTRITIONAL COUNSELING: ICD-10-CM

## 2020-05-28 ASSESSMENT — MIFFLIN-ST. JEOR: SCORE: 2205.18

## 2020-06-26 ENCOUNTER — OFFICE VISIT - HEALTHEAST (OUTPATIENT)
Dept: SURGERY | Facility: CLINIC | Age: 60
End: 2020-06-26

## 2020-06-26 DIAGNOSIS — E66.01 OBESITY, CLASS III, BMI 40-49.9 (MORBID OBESITY) (H): ICD-10-CM

## 2020-06-26 DIAGNOSIS — Z71.3 NUTRITIONAL COUNSELING: ICD-10-CM

## 2020-06-26 DIAGNOSIS — I10 BENIGN ESSENTIAL HYPERTENSION: ICD-10-CM

## 2020-06-26 ASSESSMENT — MIFFLIN-ST. JEOR: SCORE: 2205.18

## 2020-07-06 ENCOUNTER — OFFICE VISIT - HEALTHEAST (OUTPATIENT)
Dept: SURGERY | Facility: CLINIC | Age: 60
End: 2020-07-06

## 2020-07-06 ENCOUNTER — COMMUNICATION - HEALTHEAST (OUTPATIENT)
Dept: SURGERY | Facility: CLINIC | Age: 60
End: 2020-07-06

## 2020-07-06 DIAGNOSIS — E66.01 OBESITY, CLASS III, BMI 40-49.9 (MORBID OBESITY) (H): ICD-10-CM

## 2020-07-06 RX ORDER — TRIAMTERENE/HYDROCHLOROTHIAZID 37.5-25 MG
1 TABLET ORAL DAILY
Status: SHIPPED | COMMUNITY
Start: 2020-04-29

## 2020-07-06 ASSESSMENT — MIFFLIN-ST. JEOR: SCORE: 2191.58

## 2020-07-17 ENCOUNTER — COMMUNICATION - HEALTHEAST (OUTPATIENT)
Dept: SURGERY | Facility: CLINIC | Age: 60
End: 2020-07-17

## 2020-07-24 ENCOUNTER — AMBULATORY - HEALTHEAST (OUTPATIENT)
Dept: SURGERY | Facility: CLINIC | Age: 60
End: 2020-07-24

## 2020-07-24 ENCOUNTER — COMMUNICATION - HEALTHEAST (OUTPATIENT)
Dept: SURGERY | Facility: CLINIC | Age: 60
End: 2020-07-24

## 2020-07-24 DIAGNOSIS — E66.01 OBESITY, CLASS III, BMI 40-49.9 (MORBID OBESITY) (H): ICD-10-CM

## 2020-07-27 ENCOUNTER — AMBULATORY - HEALTHEAST (OUTPATIENT)
Dept: SURGERY | Facility: CLINIC | Age: 60
End: 2020-07-27

## 2021-06-03 VITALS — HEIGHT: 70 IN | WEIGHT: 315 LBS | BODY MASS INDEX: 45.1 KG/M2

## 2021-06-03 VITALS
OXYGEN SATURATION: 96 % | WEIGHT: 315 LBS | HEIGHT: 70 IN | SYSTOLIC BLOOD PRESSURE: 164 MMHG | HEART RATE: 81 BPM | DIASTOLIC BLOOD PRESSURE: 86 MMHG | BODY MASS INDEX: 45.1 KG/M2 | RESPIRATION RATE: 16 BRPM

## 2021-06-04 VITALS
WEIGHT: 315 LBS | HEIGHT: 70 IN | BODY MASS INDEX: 45.1 KG/M2 | WEIGHT: 315 LBS | BODY MASS INDEX: 45.1 KG/M2 | HEIGHT: 70 IN

## 2021-06-04 VITALS — BODY MASS INDEX: 44.67 KG/M2 | HEIGHT: 70 IN | WEIGHT: 312 LBS

## 2021-06-04 VITALS — WEIGHT: 304 LBS | BODY MASS INDEX: 43.52 KG/M2 | HEIGHT: 70 IN

## 2021-06-04 VITALS — HEIGHT: 70 IN | BODY MASS INDEX: 43.09 KG/M2 | WEIGHT: 301 LBS

## 2021-06-04 VITALS — HEIGHT: 70 IN | WEIGHT: 304 LBS | BODY MASS INDEX: 43.52 KG/M2

## 2021-06-04 VITALS — WEIGHT: 315 LBS | BODY MASS INDEX: 45.1 KG/M2 | HEIGHT: 70 IN

## 2021-06-04 NOTE — PROGRESS NOTES
"New Bariatric Surgery Consultation Note    2019    RE: Aleks Gilmore  MR#: [id number]  : 1960      Referring provider:   BAR REFERRING PROVIDER 2019   Who referred you? Houston Roque [facility] my doctor       Chief Complaint/Reason for visit: evaluation for possible weight loss surgery    Dear Houston Roque MD,    I had the pleasure of seeing your patient, Aleks Gilmore, to evaluate his obesity and consider him for possible weight loss surgery. As you know, Aleks Gilmore is 59 y.o..  He has a height of Height: 5' 10\" (1.778 m)  , a weight of   Vitals:    19 1342   Weight: (!) 330 lb (149.7 kg)   , and calculated Body mass index is 47.35 kg/m .  He's been unsuccessful with non surgical methods of weight loss and as his care taking roll for his demented mother has been lessened recently with her initiation of hospice care, he's focused on improving his own health and sees weight loss as a vital part of that health improvement. His co-morbid arthritis and hypertension would benefit greatly.     He reports a hx of prostate cancer that is reportedly in Remission and reports non detectable PSA levels. He's now followed annually by Urology.  He has a penile implant to be mindful but don't anticipate need for catheterization.    He has had GERD/gastritis history back in  (see care everywhere EGD report) and normal colonoscopy in . We'll get his stomach re-evaluated with our bariatric surgeons with EGD to make sure no silent/progressive esophagitis/gastritis issues that would affect the choice of surgery. With this history, RNY gastric bypass may be a better option for him but he is wary about avoiding the risk of dumping syndrome due to a coworker who is \"always running to the bathroom after eating\".  We discussed triggers for dumping syndrome and how to reduce such occurrences/risk.    Plan:  1. Welcome to the bariatric surgery program, Read through your manual a " couple times each month.  2. Intake labs can be done anytime.  3. Follow up with dietician and bariatric psychology visits/clearances.  4. Diana will cover any structured weight loss requirements with you today.  5. Anticipate six months of actigall use to reduce the risk of gallstones after surgery.  6. Come off caffeine the week prior to surgery and ibuprofen the month prior.  7. Check the health of your stomach and esophagus and GERD with endoscopy in the next 6 weeks or so. Continue your OTC medication for now.   8. Attend one support group meeting at least.  9. Weight should be stable or below 330 lbs.   10. Partial labs ordered today given the extensive work you had done this fall with your primary (see care everywhere).  11. Call to arrange sleep study at your convenience if you're not notified next week.  12. Continue Amlodipine and recheck blood pressure with your regular clinic this month as it was high.    13. Follow up Blood pressure with PCP this month, sooner if increased chest pains/shortness of breath or swelling.    HISTORY OF PRESENT ILLNESS:  Weight Loss History Reviewed with Patient 12/23/2019   How long have you been overweight? Since late 20's to early 40's   What is the most that you have ever weighed? 327   What is the most weight you have lost? 60   I have tried the following methods to lose weight Exercise, Atkins type diet (low carb/high protein), Slimfast, OTC Medications   I have tried the following weight loss medications? (Check all that apply) None   Have you ever had weight loss surgery? No       CO-MORBIDITIES OF OBESITY INCLUDE:  No flowsheet data found.    PAST MEDICAL HISTORY:  Past Medical History:   Diagnosis Date     Arthritis     knee.      GERD (gastroesophageal reflux disease)      Hypertension      Prostate cancer (H)     annual visits now with Urology: [facility] Dr. Berkowitz.       PAST SURGICAL HISTORY:  Past Surgical History:   Procedure  Laterality Date     BACK SURGERY  2010    he believes L5/S1 with [facility].      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   Which best describes your marital status: Single   Do you have children? Yes   Who do you have in your support network that can be available to help you for the first 2 weeks after surgery? Girlfriend   Who can you count on for support throughout your weight loss surgery journey? Girlfriend   Can you afford 3 meals a day?  Yes   Can you afford 50-60 dollars a month for vitamins? No but feels capable up to $20.       HABITS:  BAR SURGERY - HABITS 12/23/2019   How often do you drink alcohol? Never   Do you currently use any of the following Nicotine products? No   Have you ever used any of the folowing nicotine products? Cigarettes   If you previously used any of these products, what year did you quit? 2007   Have you or are you currently using street drugs or prescription strength medication for which you do not have a prescription for? No   Do you have a history of chemical dependency (alcohol or drug abuse)? No       PSYCHOLOGICAL HISTORY:   Psychological History Reviewed With Patient 12/23/2019   Have you ever attempted suicide? Never   Have you had thoughts of suicide in the past year? No   Have you ever been hospitalized for mental illness or a suicide attempt? Never   Do you have a  history of chronic pain? Yes   Have you ever been diagnosed with fibromyalgia? No   Are you currently being treated for any of the following? I do not have a mental illness       ROS:  BAR NBS ROS 12/23/2019   Skin: None of the above   HEENT: Headaches, Missing teeth   If you answered yes to missing teeth, please indicate how many: 1   Musculoskeletal: Joint Pain, Back pain, Arthritis   Cardiovascular: Chest pain, no exertional components. Advised to follow up his elevated BP today for further evaluation with Dr. Roque.   Pulmonary: Snoring, STOPBANG score of 7/8 and ESS of 2. Airway is widely patent, Mallampati I.   Gastrointestinal: Heartburn   Genitourinary: None of the above   Neurological: None of the above       EATING BEHAVIORS:  BAR SURGERY - EATING BEHAVIORS 12/23/2019   Have you or anyone else thought that you had an eating disorder? No   Do you currently binge eat (eat a large amount of food in a short time)? Yes   Are you an emotional eater? Yes   Do you get up to eat after falling asleep? Yes       EXERCISE:  BAR SURGERY - EXERCISE 12/23/2019   How often do you exercise? Less than 1 time per week   What is the duration of your exercise (in minutes)? 10 Minutes   What types of exercise do you do? walking, gym membership   What keeps you from being more active?  Pain, I have just had surgery on one or more of my joints, Worried people will look at me       MEDICATIONS:  Current Outpatient Medications   Medication Sig Dispense Refill     amLODIPine (NORVASC) 10 MG tablet Take 10 mg by mouth daily.        HYDROcodone-acetaminophen (NORCO )  mg per tablet Take 1 tablet by mouth every 8 (eight) hours as needed.        ibuprofen (ADVIL,MOTRIN) 800 MG tablet Take 800 mg by mouth as needed.        oxybutynin (DITROPAN XL) 10 MG ER tablet Take 1 tablet by mouth at bedtime.        triamcinolone (KENALOG) 0.1 % cream Apply 1 application topically 2 (two) times a week.       No current  facility-administered medications for this visit.        ALLERGIES:  No Known Allergies    LABS/IMAGING/MEDICAL RECORDS REVIEW: EGD in 2011 w/ hiatal hernia and gastritis. Currently well controlled, using unknown OTC medication.    PHYSICAL EXAM:  Vitals:    12/23/19 1357   BP: 164/86   Pulse: 81   Resp:    SpO2:      General: Middle aged  male in NAD, ambulates under his own power without assitance.  HEENT: PERRL. No acanthosis. No bruits.  CV: RRR without murmur. Radial pulses 2 plus bilaterally.  Pulm: CTA no wheezes/crackles.  ABD: small midline scar from prostatectomy. No hernia palpable. Small pannus without intertrigo. Liver non palpable. Negative tom sign.  EXT: trace pretibial edema bilaterally.  SKIN: no pallor or jaundice.     In summary, Aleks Gilmore has Class III obesity with a body mass index of Body mass index is 47.35 kg/m . kg/m2 and the comorbidities stated above. He completed an informational seminar and is a candidate for the TBD but given GERD hx favor RNY gastric bypass    Once the patient has completed the requirements in their task list and there are no further recommendations, the pt will be allowed to see the surgeon of his choice for consultation on the planned surgery. Patient verbalizes understanding of the process to surgery and expectations for the postoperative period including the need for lifelong lifestyle changes, vitamin supplementation, and laboratory monitoring.  Sincerely,     Elie Cain MD    I spent a total of 60 minutes face to face with the patient during today's office visit. Over 50% of this time was spent counseling the patient and/or coordinating care.

## 2021-06-04 NOTE — PATIENT INSTRUCTIONS - HE
Plan:  1. Welcome to the bariatric surgery program, Read through your manual a couple times each month.  2. Intake labs can be done anytime.  3. Follow up with dietician and bariatric psychology visits/clearances.  4. Diana will cover any structured weight loss requirements with you today.  5. Anticipate six months of actigall use to reduce the risk of gallstones after surgery.  6. Come off caffeine the week prior to surgery and ibuprofen the month prior.  7. Check the health of your stomach and esophagus and GERD with endoscopy in the next 6 weeks or so. Continue your OTC medication for now.   8. Attend one support group meeting at least.  9. Weight should be stable or below 330 lbs.   10. Partial labs ordered today given the extensive work you had done this fall with your primary (see care everywhere).  11. Call to arrange sleep study at your convenience if you're not notified next week.  12. Continue Amlodipine and recheck blood pressure with your regular clinic this month as it was high.          Before being submitted for insurance approval, you will need the following:    -Clearance by the Psychologist  -Clearance by the dietitian  -Attend Support Group ([address] [place] [address] at [facility]) 2nd Tuesday of the month 6:30-8pm. Make sure to sign in.  -Routine Health Care Maintenance must be up to date (mammograms yearly after age 40, paps as recommended by your primary provider,  colonoscopy after age 50, earlier if high risk.  -If you are on estrogen, estrogen will be discontinued one month prior to surgery. It may be resumed one month after surgery unless otherwise advised to limit blood clotting risks.  -Pre Operative Lab work-ordered today.    -Structured weight loss visits IF mandated by your insurance carrier or bariatrician.  -Surgeon consult as we get nearer to potential surgery or sooner if you have special considerations that may make your surgery more complex.  -If you have sleep apnea you will need to be  using CPAP for at least one month before surgery to reduce your risk of breathing problems after surgery. Make sure to bring your CPAP or BiPAP to the hospital at the time of surgery.    -You will need to be tobacco free for 2 months before surgery and remain a non-smoker thereafter. If you are currently smoking or have recently quit, your urine will be evaluated for tobacco metabolites pre-operatively.  Smoking is a major risk factor for developing ulceration of your surgical sites and potential severe complications.    -If you are on insulin, you might be referred to an endocrinologist who will manage your insulin during the liquid diet and around the time of surgery. This endocrinologist does not replace your primary provider or your endocrinologist.   -You will need an exercise plan which includes MOVE, ie., walking and MUSCLE, ie.,calisthenics, bands, weight, machines, etc...Maintenance of long term weight loss and quality of life is much improved with regular exercise as the weight comes off.  ______________________________________________________________________    Remember that after your bariatric surgery, vitamin supplementation is a lifelong need.    You will take:    B-12 300-500mcg or higher sublingual (under the tongue) daily or by 1000mcg injection 1-2X/month  D3:  3000- 5000 IUs daily   Multivitamin containing 18mg of iron twice a day  Calcium citrate 1 or 2 daily    To keep your weight off and your vitamin levels up, follow-up is important.    Your labs will be monitored every 6 months for the first two years (every 3 months if you had a duodenal switch) and yearly thereafter.    To avoid ulcers in your stomach avoid tobacco forever, alcohol in excess, caffeine in excess and anti-inflammatories (NSAIDS)  (Aspirin, Ibuprofen, Naproxen and similar medications). Tylenol is fine at usual doses on the box.    If you are told by your physician take Aspirin to protect your heart or for another reason, make  sure to take omeprazole or similar medication (protonix, nexium, prevacid) to protect your stomach.    Remember that alcohol affects you differently after bariatric surgery. If you have even ONE drink DO NOT DRIVE due to rapid absorption and fast spiking of blood alcohol levels within minutes of consumption.     Slowly Increasing your exercise capacity such that 3-6 months after your surgery you're tolerating 150-250 minutes of exercise weekly will help optimize your metabolism and improve the chance of good weight loss maintenance.                Patient to follow up with Primary Care provider regarding elevated blood pressure.      Cierra Mccoy [facility]  P: [phone] I F: [phone]

## 2021-06-05 NOTE — OP NOTE
POST ENDOSCOPY NOTE      Pre-op Dx:              Morbid obesity (H) [E66.01]  Esophageal reflux [K21.9]      Procedure:             ESOPHAGOGASTRODUODENOSCOPY (EGD)      Indications:            reflux      Findings:                The scope was advanced down the esophagus through the stomach into the duodenum with ease.  On withdrawal, the duodenum was noted to be normal without evidence of ulcers or inflammation.  The pylorus was rather strikingly wide open, and during the course of the scope we did not see it close down at all.  The stomach was without gastritis, ulcerations or Garfield's erosions.  On retroflexion we could see a hiatal hernia.  Drawing further back, the diaphragm pinch was present at 41 cm from the incisors, while the Z line was present at 38 cm from the incisors.  The Z line was otherwise regular in appearance with the exception of 2 small areas of esophageal ulceration, without overt evidence of esophagitis.  The remaining esophagus was normal in appearance.      EBL:                        Minimal      Medications:            Current Facility-Administered Medications   Medication Dose Route Frequency Provider Last Rate Last Dose     benzocaine 20 % mouth spray (HURRICAINE; TOPEX)    PRPeter Segovia MD   2 spray at 01/22/20 1336     fentaNYL pf injection (SUBLIMAZE)    PRPeter Segovia MD   100 mcg at 01/22/20 1335     midazolam (PF) injection (VERSED)    PRPeter Segovia MD   2 mg at 01/22/20 1339       * No specimens in log *    Complications:      None      Post-op Dx:            Gastroesophageal reflux with hiatal hernia      Recommendation:   Would recommend bypass over sleeve given degree of hiatal hernia, and the rather patulous pyloric valve which may worsen reflux symptoms and a sleeve patient      Peter Mcgill  1/22/2020 1:51 PM  [facility]  [phone]

## 2021-06-05 NOTE — PROGRESS NOTES
Follow Up Surgical Weight Loss Supervised Diet Evaluation    Assessment:  Pt. is being seen today for a follow up RD nutritional evaluation. Pt. has been unsuccessful with non-surgical weight loss methods and is interested in bariatric surgery. Today we reviewed current eating habits and level of physical activity, and instructed on the changes that are required for successful bariatric outcomes.    Workflow review:  Support Group: Completed.  Psychology:In progress.  Lab work:Completed.  SWL:No   Sleep study needed    Weight goal: At or below initial.    Patient Active Problem List:  Patient Active Problem List   Diagnosis     RBBB     Prostate cancer (H)     Primary osteoarthritis of right knee     Obesity     Neck pain     Leg pain     Left scapholunate ligament tear     Intervertebral disk disease     Eczema     Chronic anemia     Benign prostatic hyperplasia     Morbid obesity (H)     Esophageal reflux       Pt's Initial Weight: 330 lbs  Weight: (!) 323 lb 1.6 oz (146.6 kg)  Weight loss from initial: 6.9  % Weight loss: 2.09 %    BMI: Body mass index is 46.36 kg/m .    Estimated RMR (Medina-St Jeor equation): 2301 calories    Progress over past month:   1. Three meals per day  2. Fast food 4 days per week  3. Eating within 12 hour window    Diabetic: No  HbA1c:  No results found for: HGBA1C  Diet Recall/Time:   Breakfast: peanut butter and jelly OR egg mcmuffin  Am Snack: fritos, granola bar  Lunch: skip- 6 inch subs  Dinner: chicken wings- 10, 2 pieces of bread  HS Snack: none    Meals per week away from home:  3 days per week    Meal duration: 10 minutes.     Beverages (Type/Oz. per day)  Water: lemon water- 64oz+  Coffee: 2 cups (40oz)     fluids by 30 minutes before, during meal, and waiting 30 minutes after meal before drinking fluids: No    Exercise  Type: none    PES statement:   1. (NI-1.3)Excessive energy intake related to Food and nutrition related knowledge deficit concerning excessive  energy/oral intake as evidenced by Intake of high caloric density foods/beverages (juice, soda, alcohol) at meals and/or snacks; large portions; frequent grazing; Estimated intake that exceeds estimated daily energy intake; Binge eating patterns; Frequent excessive fast food or restaurant intake; and BMI 46.36     Intervention    Nutrition Education:   1. Provided general overview of diet and lifestyle modifications needed to be a deemed a safe candidate for bariatric surgery.    Food/Nutrient Delivery:  2. Educated pt on eating three meals, with cutting out snacking.  3. Bariatric Plate: Pt and I discussed the importance of including a lean protein source (20-30 grams/meal), vegetables (included at lunch and dinner), one serving (15g) of carbohydrate, and limited added fat (1 tb/day) at each meal.   4. Educated pt on how to complete a food journal and benefits of meal planning.   5. Educated pt on using a protein powder drink as a meal replacement and/or supplement after bariatric surgery.   6. Discussed importance of adequate hydration after surgery, with goal of at least 64 oz of fluids/day.    Nutrition Counselin. Mindful eating techniques: Encouraged slow meal pace, chewing foods to applesauce consistency for 20-30 minutes/meal.   8. Discussed  fluids 30 minutes before, during, and after meal to prevent dumping syndrome and discomfort post bariatric surgery.        Instructions/Goals:     1. Fill out food journal and return at follow up.  2. Practice  fluids from meals    Handouts Provided:   Pt. [facility] Bariatric Care Patient Handbook  Bariatric Plate  Food journal    Monitor/Evaluation:  Pt. s target weight: no gain from initial visit, pt. verbalized understanding.     Plan for next visit:   Review Bariatric plate and food journal homework.  Educate on dumping syndrome and reading food labels.  (Final Supervised Diet visit with RD) pre/post-op  diet progression, give review  of surgery process.  Review [place] to Bariatric Success    Visit length: 30 minutes.     ABN signed: Yes

## 2021-06-05 NOTE — H&P
"PRE PROCEDURE NOTE - H & P      Chief Complaint/Reason for Procedure:              GERD      History and Physical Reviewed:   Reviewed no changes               Pre-sedation assessment:            /76   Pulse 67   Temp 97.4  F (36.3  C) (Temporal)   Resp 16   Ht 5' 10\" (1.778 m)   Wt (!) 327 lb (148.3 kg)   SpO2 99%   BMI 46.92 kg/m    Lungs: clear to auscultation bilaterally  Heart: regular rate and rhythm      Previous reaction to anesthesia/sedation:        Sedation Plan based on assessment: Conscious sedation      Comments: Okay for conscious sedation      ASA Classification: 2      Impression:  Patient deemed adequate candidate for conscious sedation         Plan:   esophagogastroduodenoscopy      Peter Mcgill  1/22/2020 1:21 PM  [facility]  [phone]                                "

## 2021-06-05 NOTE — PROGRESS NOTES
Pt attended support group on 1/14/2020.  Workflow updated.    Zeinab Bacon RN, CBN  [facility]  P [phone]  F [phone]

## 2021-06-05 NOTE — PROGRESS NOTES
Health and Behavior Assessment with Intervention, Initial (60 minutes): Met with patient 1:1 to obtain demographics and background information, reasons for surgery, typical eating pattern/fluid intake as well as psychiatric history.  Aleks is a 59-year-old single male who would like to follow through with bariatric surgery for health reasons.  He has struggled with his weight for much of his life and now is concerned about various comorbidities.  He admits that he will have a difficult time making changes in his eating behaviors as he proceeds with surgery.  He has superficial knowledge of the surgery with decent support.  He will follow-up and complete psychological testing.  Diagnoses: F 50.9; E 66.01

## 2021-06-05 NOTE — PROGRESS NOTES
Initial Structured Weight Loss Supervised Diet Evaluation     Assessment:  Pt. is being seen today for initial RD nutritional evaluation. Pt. has been unsuccessful with non-surgical weight loss methods and is interested in bariatric surgery. Today we reviewed current eating habits and level of physical activity, and instructed on the changes that are required for successful bariatric outcomes.    Patient would like to lose weight to help reduce blood pressure, move better and be healthier.    Workflow review:  Support Group: Not completed.  Psychology:Not completed.  Lab work:Completed.  SWL:No   EGD in a couple weeks, waiting on scheduling sleep study    Weight goal: At or below initial.    Patient Active Problem List:  Patient Active Problem List   Diagnosis     RBBB     Prostate cancer (H)     Primary osteoarthritis of right knee     Obesity     Neck pain     Leg pain     Left scapholunate ligament tear     Intervertebral disk disease     Eczema     Chronic anemia     Benign prostatic hyperplasia     Morbid obesity (H)     Esophageal reflux     Pt's Initial Weight: 330 lbs  Weight: (!) 325 lb 14.4 oz (147.8 kg)  Weight loss from initial: 4.1  % Weight loss: 1.24 %    BMI: Body mass index is 46.76 kg/m .    Estimated RMR ([place]-St Jeor equation): 2314 calories    Food allergies, intolerances, and [place] customs: NKFA    Diabetic: No  HbA1c:  No results found for: HGBA1C  Vitamins currently taking: Vitamin D2    Biggest struggle with weight loss:eating out frequently- fast food    Socioeconomic Status:  Where do you grocery shop?: [employer]  Utilizing food bank, food stamps, and/or meal delivery program(s)?: No     Who does the grocery shopping for your household? Self and girlfriend  Who prepares your meals at home? Go out- cooking rarely- girlfriend will cook     Diet Recall/Time:   Wake up time: 3:45a  Breakfast: none- if eating- [employer] breakfast-2 sausage sliders  Am Snack: none  Lunch: 11a- package of  cookies from the vending machine  Pm snack: none  Dinner: 5 pm- 8 wings  HS Snack: none    Overnight eating: Yes leftovers    Per Diet recall estimated protein: 20 grams    Meals per week away from home: daily- fast food    Beverages (Type/Oz. per day)  Water: 40oz  Coffee: 24oz- black  Milk: none  Soda: 32oz daily- regular  Alcohol: none  Other: none    Exercise  Type: none    PES statement:   1. (NI-1.3)Excessive energy intake related to Food and nutrition related knowledge deficit concerning excessive energy/oral intake as evidenced by Intake of high caloric density foods/beverages (soda) at meals and/or snacks; large portions; Estimated intake that exceeds estimated daily energy intake; Frequent excessive fast food or restaurant intake; and BMI 46.76     Intervention    Nutrition Education:   1. Provided general overview of diet and lifestyle modifications needed to be a deemed a safe candidate for bariatric surgery.     Food/Nutrient Delivery:  2. Educated pt on eating three meals, with cutting out snacking.    Instructions/Goals:     1. Eat three meals per day  2. Limit fast food/eating out to 3-4 times per week    Handouts Provided:   Pt. [facility] Bariatric Care Patient Handbook    Monitor/Evaluation:  Pt. s target weight: no gain from initial visit, pt. verbalized understanding.     Plan for next visit:   Bariatric plate. Give food journal homework.  Educate on dumping syndrome and reading food labels.  Review [place] to Bariatric Success    Visit length: 30 minutes.     ABN signed: Yes

## 2021-06-07 NOTE — PROGRESS NOTES
"Aleks Gilmore is a 59 y.o. male who is being evaluated via a billable telephone visit.      The patient has been notified of following:     \"This telephone visit will be conducted via a call between you and your physician/provider. We have found that certain health care needs can be provided without the need for a physical exam.  This service lets us provide the care you need with a short phone conversation.  If a prescription is necessary we can send it directly to your pharmacy.  If lab work is needed we can place an order for that and you can then stop by our lab to have the test done at a later time.    If during the course of the call the physician/provider feels a telephone visit is not appropriate, you will not be charged for this service.\"     Aleks Gilmore complains of    Chief Complaint   Patient presents with     Nutrition Counseling       I have reviewed and updated the patient's Past Medical History, Social History, Family History and Medication List.    ALLERGIES  Patient has no known allergies.    Additional provider notes:     Follow Up Surgical Weight Loss Supervised Diet Evaluation    Assessment:  Pt. is being seen today for a follow up RD nutritional evaluation. Pt. has been unsuccessful with non-surgical weight loss methods and is interested in bariatric surgery. Today we reviewed current eating habits and level of physical activity, and instructed on the changes that are required for successful bariatric outcomes.    Workflow review:  Support Group: Completed.  Psychology:In progress.  Lab work:Completed.  SWL:No   Sleep study needed    Weight goal: At or below initial.    Patient Active Problem List:  Patient Active Problem List   Diagnosis     RBBB     Prostate cancer (H)     Primary osteoarthritis of right knee     Obesity     Neck pain     Leg pain     Left scapholunate ligament tear     Intervertebral disk disease     Eczema     Chronic anemia     Benign prostatic hyperplasia     " Morbid obesity (H)     Esophageal reflux       Pt's Initial Weight: 330 lbs  Weight: (!) 312 lb (141.5 kg)  Weight loss from initial: 18  % Weight loss: 5.45 %    BMI: Body mass index is 44.77 kg/m .    Estimated RMR ([place]-St Jeor equation): 2251 calories    Progress over past month: patient reports that he has been eating more meals at home; however, his portions are a little too big still  1. Separate fluids from meals- goal not met- has been trying for the past couple weeks    Diabetic: No  HbA1c:  No results found for: HGBA1C  Diet Recall/Time:   Breakfast: cinnamon and maple oatmeal   Lunch: orange  Dinner: baked chicken wings, 1/4 can corn and a slice of bread    Per Diet recall estimated protein: 30 grams    Meal duration: 10 minutes.     Beverages (Type/Oz. per day)  Water: 64oz  Coffee: 20oz     fluids by 30 minutes before, during meal, and waiting 30 minutes after meal before drinking fluids: No    Exercise  Type: walking at work    PES statement:   1. (NI-1.3)Excessive energy intake related to Food and nutrition related knowledge deficit concerning excessive energy/oral intake as evidenced by Intake of high caloric density foods/beverages (juice, soda, alcohol) at meals and/or snacks; large portions; frequent grazing; Estimated intake that exceeds estimated daily energy intake; Binge eating patterns; Frequent excessive fast food or restaurant intake; and BMI 44.77     Intervention    Nutrition Education:   1. Provided general overview of diet and lifestyle modifications needed to be a deemed a safe candidate for bariatric surgery.     Food/Nutrient Delivery:  2. Educated pt on eating three meals, with cutting out snacking.  3. Bariatric Plate: Pt and I discussed the importance of including a lean protein source (20-30 grams/meal), vegetables (included at lunch and dinner), one serving (15g) of carbohydrate, and limited added fat (1 tb/day) at each meal.   4. Educated pt on using a protein  powder drink as a meal replacement and/or supplement after bariatric surgery.   5. Discussed importance of adequate hydration after surgery, with goal of at least 64 oz of fluids/day.  6. Addressed avoiding all carbonated, caffeinated and sweetened drinks to prepare for bariatric surgery.     Nutrition Counselin. Mindful eating techniques: Encouraged slow meal pace, chewing foods to applesauce consistency for 20-30 minutes/meal.   8. Discussed  fluids 30 minutes before, during, and after meal to prevent dumping syndrome and discomfort post bariatric surgery.       Instructions/Goals:     1. Protein shake for breakfast  2. Lunch meat sandwich for lunch  3. Separate fluids from meals      Monitor/Evaluation:  Pt. s target weight: no gain from initial visit, pt. verbalized understanding.     Plan for next visit:   (Final Supervised Diet visit with RD) pre/post-op  diet progression, give review of surgery process.  Review [place] to Bariatric Success    Assessment/Plan:  1. Obesity, Class III, BMI 40-49.9 (morbid obesity) (H)    2. Nutritional counseling      Phone call duration: 15 minutes    Barbara Deleon RD

## 2021-06-07 NOTE — PROGRESS NOTES
"Aleks Gilmore is a 59 y.o. male who is being evaluated via a billable video visit.      The patient has been notified of following:     \"This video visit will be conducted via a call between you and your physician/provider. We have found that certain health care needs can be provided without the need for an in-person physical exam.  This service lets us provide the care you need with a video conversation.  If a prescription is necessary we can send it directly to your pharmacy.  If lab work is needed we can place an order for that and you can then stop by our lab to have the test done at a later time.    Video visits are billed at different rates depending on your insurance coverage. Please reach out to your insurance provider with any questions.    If during the course of the call the physician/provider feels a video visit is not appropriate, you will not be charged for this service.\"    Patient has given verbal consent to a Video visit? Yes      Video-Visit Details    Type of service:  Video Visit    Video End Time (time video stopped): 12:53 PM    Originating Location (pt. Location): Home    Distant Location (provider location):  [facility] GENERAL SURGERY AND BARIATRICS CARE     Mode of Communication:  Video Conference via [email or web address]      Handy Mann, Ph.D,LP    Health and Behavior Assessment with Intervention, Follow up (60 minutes): Met with patient 1:1 to review support system, psychological testing and mindful eating strategies.  Aleks has made some changes in his eating and lifestyle but still needs to be more mindful about his eating.  He will follow-up with mindful eating strategies.  Diagnoses: F 50.9; rule out F 41.9; E 66.01    "

## 2021-06-08 NOTE — PROGRESS NOTES
"Aleks Gilmore is a 59 y.o. male who is being evaluated via a billable video visit.      The patient has been notified of following:     \"This video visit will be conducted via a call between you and your physician/provider. We have found that certain health care needs can be provided without the need for an in-person physical exam.  This service lets us provide the care you need with a video conversation.  If a prescription is necessary we can send it directly to your pharmacy.  If lab work is needed we can place an order for that and you can then stop by our lab to have the test done at a later time.    Video visits are billed at different rates depending on your insurance coverage. Please reach out to your insurance provider with any questions.    If during the course of the call the physician/provider feels a video visit is not appropriate, you will not be charged for this service.\"    Patient has given verbal consent to a Video visit? Yes    Patient would like to receive their AVS by AVS Preference: Steve.    Patient would like the video invitation sent by: Text to cell phone: [phone]    Will anyone else be joining your video visit? No      Video Start Time: 3:12 PM    Additional provider notes:       Video-Visit Details    Type of service:  Video Visit    Video End Time (time video stopped): 3:29 PM  Originating Location (pt. Location): Home    Distant Location (provider location):  [facility] GENERAL SURGERY AND BARIATRICS CARE     Platform used for Video Visit: Alfred Deleon RD      Follow Up Surgical Weight Loss Supervised Diet Evaluation    Assessment:  Pt. is being seen today for a follow up RD nutritional evaluation. Pt. has been unsuccessful with non-surgical weight loss methods and is interested in bariatric surgery. Today we reviewed current eating habits and level of physical activity, and instructed on the changes that are required for successful bariatric " outcomes.    Workflow review:  Support Group: Completed.  Psychology:In progress.  Lab work:Completed.  SWL:No   Sleep study needed    Weight goal: At or below initial.    Patient Active Problem List:  Patient Active Problem List   Diagnosis     RBBB     Prostate cancer (H)     Primary osteoarthritis of right knee     Obesity     Neck pain     Leg pain     Left scapholunate ligament tear     Intervertebral disk disease     Eczema     Chronic anemia     Benign prostatic hyperplasia     Morbid obesity (H)     Esophageal reflux       Pt's Initial Weight: 330 lbs  Weight: (!) 304 lb (137.9 kg) (patient recorded Tuesday)  Weight loss from initial: 26  % Weight loss: 7.88 %    BMI: Body mass index is 43.62 kg/m .    Estimated RMR ([place]-St Jeor equation): 2220 calories    Progress over past month: patient states he has been working hard on limiting fast food and eating three meals per day, did state he has been having a bit more sugar lately    Diabetic: No  HbA1c:  No results found for: HGBA1C  Diet Recall/Time:   Breakfast: oatmeal   Lunch: chicken, tuna sandwiches  Dinner: baked chicken sandwich, mac and cheese    +snacking on chips  Meal duration: 15-20 minutes- is working on chewing well    Beverages (Type/Oz. per day)  Water: 64+oz  Coffee: 20oz     fluids by 30 minutes before, during meal, and waiting 30 minutes after meal before drinking fluids: Yes- is work on this    Exercise  Type: just got stationary bike- will start this weekend     PES statement:   1. (NI-1.3)Excessive energy intake related to Food and nutrition related knowledge deficit concerning excessive energy/oral intake as evidenced by Intake of high caloric density foods/beverages (juice, soda, alcohol) at meals and/or snacks; large portions; frequent grazing; Estimated intake that exceeds estimated daily energy intake; Frequent excessive fast food or restaurant intake; and BMI 43.62     Intervention    Nutrition Education:   1. Provided  general overview of diet and lifestyle modifications needed to be a deemed a safe candidate for bariatric surgery.   2. Educated pt on how to read a food label: choosing foods with than 10 grams fat and 10 grams sugar per serving to avoid dumping syndrome.  3. Dumping Syndrome: Described the mechanisms of syndrome, symptoms, and prevention tools from a dietary perspective.     Food/Nutrient Delivery:  4. Educated pt on eating three meals, with cutting out snacking.  5. Educated pt on using a protein powder drink as a meal replacement and/or supplement after bariatric surgery.   6. Discussed importance of adequate hydration after surgery, with goal of at least 64 oz of fluids/day.  7. Addressed avoiding all carbonated, caffeinated and sweetened drinks to prepare for bariatric surgery.     Nutrition Counselin. Mindful eating techniques: Encouraged slow meal pace, chewing foods to applesauce consistency for 20-30 minutes/meal.   9. Discussed  fluids 30 minutes before, during, and after meal to prevent dumping syndrome and discomfort post bariatric surgery.       Instructions/Goals:     1. Practice label reading for 10-10 Rule    Handouts Provided:   Pt. [facility] Bariatric Care Patient Handbook      Monitor/Evaluation:  Pt. s target weight:  no gain from initial visit, pt. verbalized understanding.     Plan for next visit:   (Final Supervised Diet visit with RD) pre/post-op  diet progression, give review of surgery process.  Review [place] to Bariatric Success

## 2021-06-08 NOTE — PROGRESS NOTES
"Aleks Gilmore is a 59 y.o. male who is being evaluated via a billable video visit.      The patient has been notified of following:     \"This video visit will be conducted via a call between you and your physician/provider. We have found that certain health care needs can be provided without the need for an in-person physical exam.  This service lets us provide the care you need with a video conversation.  If a prescription is necessary we can send it directly to your pharmacy.  If lab work is needed we can place an order for that and you can then stop by our lab to have the test done at a later time.    Video visits are billed at different rates depending on your insurance coverage. Please reach out to your insurance provider with any questions.    If during the course of the call the physician/provider feels a video visit is not appropriate, you will not be charged for this service.\"    Patient has given verbal consent to a Video visit? Yes    Video-Visit Details    Type of service:  Video Visit    Video End Time (time video stopped): 12:23 PM  Originating Location (pt. Location): Home    Distant Location (provider location):  [facility]     Platform used for Video Visit: [email or web address]      Handy Mann, Ph.D,LP      Health and Behavior Assessment with Intervention for  Bariatric Surgery Candidates    Name: Aleks Gilmore   YOB: 1960  Dates of Service: 2020, 2020, 2020  Psychological Testin2020  Report Date: 2020    Height: 5 feet 10 inches reported Weight: 323 pounds  BMI: 46.36  Anticipated Weight: 230 pounds    Identifying Data: This is a 59 y.o. single father of 3 children (ages 40, 32 and 24). He was referred by Peter Mcgill MD at [facility] to determine readiness for bariatric surgery from a psychosocial perspective. He learned about the surgery by attending the informational meeting and seeing a " "coworker go through the process.    Reason for Pursuing Surgery: He would like to follow through with bariatric surgery for health reasons.  He noted \"I am 59 and just got diagnosed with high blood pressure.\"    Diagnostic Impressions:  Principal Diagnosis: F 50.9 unspecified feeding and eating disorder; rule out F 41.9 unspecified anxiety disorder  Secondary diagnoses: Morbid obesity, high blood pressure, history of prostate cancer, heartburn, GERD, hiatal hernia, degenerative arthritis, anemia and pain in his knee    Conclusions    Recommendations: This patient has not made significant changes in his eating and lifestyle.  He is also not 100% certain that he wants to have surgery.  Thus, at this point, he cannot yet be deemed a viable candidate for bariatric surgery from a psychosocial perspective.  The final decision to follow through with bariatric surgery should be done in collaboration with [facility] clinical staff.    Current Treatment Plan and Aftercare Plan: In order to be deemed a viable candidate for bariatric surgery this patient will need to complete the following treatment recommendations: Continue to work with bariatric dietitian to focus on improved nutrition and eating behaviors, attend support group meetings to improve knowledge of the surgery and support around the surgery, needs a mindful eating behaviors, document his eating, measure his food, plan out his meals, and increase activity level, and follow-up with this clinician only after he is 100% certain he wants to follow through with surgery and the dietitian feels that he is in a better position to proceed.    Special Needs or Precautions: Monitor this patient's ability to avoid mindless eating and keep anxiety in check.  He also will need to be under percent certain he wants to follow through.    Compliance, Motivation and Expectations (Change in Behavior): This patient has made significant changes in his eating " "and lifestyle. He is highly motivated to continue to make these changes post-operatively. He has realistic expectations about the surgery.     Self-Perception of Readiness for Surgery: This patient rated her readiness for surgery at a 4, where 10 means \"extremely well prepared.\"    The following history supports the above conclusions and treatment recommendations.    History of Presenting Illness: This patient has struggled with his weight since 1990.  At that time he was between 235 and 240 pounds but felt that he was muscular.  He reached 300 pounds in June 2019.  His highest weight is his current weight. He believes morbid obesity has affected his daily life through low self-esteem, feeling self-conscious and embarrassed and avoiding social situations.  He has had difficulty buying clothing, getting in and out of a small chair, getting up from the floor, sitting in a chew at a restaurant, getting in and out of a small car as well as low endurance.    Previous Attempts at Disease Management: This patient has never been through structured weight loss and at most has lost 60 pounds.  He believes he gained weight over time because of back surgery in 2010 and was less active.  He was also caring for his mother.    Self-Care Behaviors  Day and Night Routine: This patient goes to sleep between 10 and 11 PM and wakes up at 3:45 AM.  He is generally fatigued throughout the day.  His work hours are between 5:30 AM and 2 PM as a  at the [employer] in [place].  He had been going to his grandson's basketball games prior to the stay at home order.    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    Self-Care and Treatment Adherence: This patient noted that he is not taking as good care of himself as he should.  His hygiene is good, he complies with medical advice given to him and gets regular physical and dental " exams.    Stress Management and Coping Mechanisms: This patient christine with stress by getting mad and watching television.    Other Impulsive and Compulsive Tendencies  Gambling: Denied  Compulsive Spending: Denied  Credit Card Debt: $7000  Bankruptcy: 2015    Legal History: Denied    Physical and Leisure Activities: This patient was getting a minimal amount of activity because of foot surgery and arthritis in his knee.    Substance Use  OTC and Prescription Medications: This patient denied a history of medication abuse and reportedly takes his medications as directed.  Nicotine: This patient started smoking cigarettes in 1978, was up to a pack per day and quit in 2007.  Alcohol: This patient started drinking alcohol in 1982, felt that it was a problem in which she was drinking daily and last drink in 2006.  He understands the increased risk of intoxication following a bariatric surgical procedure.  Illicit Substances: This patient started using cannabis in 1983 for a year, recall that it was laced with crack at times but has not done so for more than 30 years.  He first tried crack cocaine in 1982, did so up to twice per week and last did so in 2006.  He did go to treatment at [facility] in 2006 and then went to an  meetings.    Typical Eating Pattern and Fluid Intake  Eating Disorder-Related Behavior: This patient denied a history of misusing diuretics or laxatives, of making himself vomit to lose weight, of starving himself, of over-exercising, of taking illegal substances or of smoking cigarettes for weight control purposes.  He has a history of overeating and grazing.  Historically he tended to eat his first meal at 8 AM consisting of peanut butter and jelly sandwich and sausage McMuffin.  He used to skip it.  Later he would snack on a granola bar.  Lunch was between 11 AM and 12 PM consisting of a 6 inch Italian sandwich and cookies.  Later he would snack on chips and Fritos.  Dinner was between 5  "and 7 PM consisting of chicken, quarter pounder with cheese, chicken wings, tuna fish sandwich, lettuce and corn on the cob.  He was having 40 ounces of coffee, 32 ounces of Coke 3 times per week, 64 to 80 ounces of water with lemon and milk with his cereal.    Since starting the health and behavior assessment he was eating breakfast at 8:30 AM consisting of oatmeal and granola bar.  Lunch consisted of a candy bar but otherwise he was skipping it.  Later he would have fruit and chips.  Dinner was between 6 and 6:30 PM consisting of chicken wings and corn.  He was also having salad and ribs.  He was having 30 ounces of coffee, 72 ounces of water plus lemon and no longer having soda pop on a daily basis.  He tended to lose control of his eating when he was on his own and his comfort food included a chicken dinner.    Psychiatric History  Traumatic Life Events and Abuse/Neglect History: This patient denied a history of trauma as well as physical, emotional or sexual abuse.  With reference to self-esteem he noted \"it is not good and I feel sloppy.\"  He denied being put down or teased because of his physical condition.  He denied any history of depression or suicidal ideation.  He does worry about his kids and his mother who is in hospice.  He has had some social anxiety.  He is concerned that people will be negative about his weight.  He denied panic or obsessive-compulsive symptoms.  He denied being in psychotherapy or on a psychotropic medication trial.    Medical History (by patient report and without consulting with his primary care physician). This patient is followed medically by Houston Roque MD at [facility] who reportedly supports the patient's candidacy for bariatric surgery.    Allergies/Sensitivities: Denied  Prenatal Complications, Birth Trauma, Unusual Birth Weight: Denied  Head Trauma, Concussion, Extremely High Fevers, Seizures: Denied  Thyroid Function: Reportedly normal.  Hospitalizations and " "Surgeries: Back, prostate, hand and foot  Current Medications:   Current Outpatient Medications:      amLODIPine (NORVASC) 10 MG tablet, Take 10 mg by mouth daily. , Disp: , Rfl:      HYDROcodone-acetaminophen (NORCO )  mg per tablet, Take 1 tablet by mouth every 8 (eight) hours as needed. , Disp: , Rfl:      ibuprofen (ADVIL,MOTRIN) 800 MG tablet, Take 800 mg by mouth as needed. , Disp: , Rfl:      oxybutynin (DITROPAN XL) 10 MG ER tablet, Take 1 tablet by mouth at bedtime. , Disp: , Rfl:      triamcinolone (KENALOG) 0.1 % cream, Apply 1 application topically 2 (two) times a week., Disp: , Rfl:   Vitamins/Supplements: Vitamin D  Activities of Daily Living (ADLs): This patient has difficulty tying his shoes.  Attitudes, Fears, or Concerns Regarding this Surgery: This patient has no concerns about the surgery and understands the risks.    Family History  This patient has a family history that is positive for obesity, high blood pressure, heart disease, stroke, diabetes mellitus, arthritis, anxiety, alcoholism and illicit substance use.    Social and Developmental History:  This patient was born and raised in [place], [place].  His father  in  at age 47 and his mother is still living at age 75.  His parents  when he was in the fourth or fifth grade.  He had a sister who passed away and still has 1 sister and 2 brothers who are living.  He is closest to his sister.  He is third in the sibship.  During his upbringing \"it was pretty fine.  We were all close.\"  This patient was the only one who graduated in his family.    Educational History: This patient graduated from [employer] in .  Employment: This patient is a  at the [employer] in [place] and had been a carrier prior to that.  Current Living Situation, Partner, and Children: This patient has been in a relationship for 3 years but does not feel that it is a good relationship.  They argue a lot.  " He lives with his significant other and his son.  He has a 40-year-old daughter with whom he has an on again off again relationship, a 32-year-old son and a 24-year-old daughter who is not biological to him.  His support includes his son, significant other and coworker.  Jewish Orientation/Spiritual Support: [place]din   History: Denied  Two Year Goals: This patient would like to be down to 230 pounds, feel happier and feel healthier.    Psychological Testing: The Alcohol Use Disorders Identification Test (AUDIT) is a measure to determine how alcohol affects overall functioning and treatment. His score was 0 although he did have some struggles with alcohol in the past.    This patient scored a 3 on the Adverse Childhood Experience (ACE) Questionnaire suggesting that he did experience some threatening behaviors growing up.    This patient scored at a clinically significant level for body shape concerns on the Eating Disorders Examination Questionnaire.    This patient did not score at a clinically significant level for night eating on the Night Eating Questionnaire or Binge Eating on the Binge Eating Scale.    This patient scored at a clinically significant level for physical function, self-esteem and public distress on the Impact of Weight on Quality of Life Questionnaire-Lite Version.    The [place] Multiphasic Personality Rlggiyeyp-8-Qjhtnqerqacz Form (MMPI-2-RF) was responded to in an open and honest manner and the profile is valid and interpretable.  Individuals with profiles similar to his tend to be in some distress and are not functioning optimally.  They likely manifest anxiety symptoms.  They tend to ruminate.  They feel self-conscious and avoid social situations.  They somaticize and worry about their health.    Mental Status: This patient came to the evaluation setting dressed casually, although appropriately. He was generally cooperative and responded appropriately to this clinician's  questions. His affect was generally bright and His mood was consist with his affect. There is no evidence of obsessions, compulsions, suicidal ideation or homicidal ideation. There is no evidence of hallucinations, delusions, paranoid ideation, grossly inappropriate affect or other rebeca manifestation of psychotic disorder. He was oriented to person, place and time, and there is no evidence of any problems with impulse control.

## 2021-06-09 NOTE — PROGRESS NOTES
Non surg f/u:    Knee pain improving.   Discussed med options.  Discussed keeping up w/ D3.  Is a good candidate for Saxenda due to hypertension and opiate use thus eliminating phentermine/contrave therapies. Worry about some clouding of cognition w/ topamax as well and thus Saxenda seems to be a good option for treating his class III, morbid obesity w/ comorbid hypertension and osteoarthritis of the knees (pending replacement in near future).   Plan:  1. Doing well w/ diet change, given desire to put off surgery for now, focus non surgical weight loss w/ ongoing dietary routines/protein intake and good hydration throughout the day.  2. Continue riding your bike, keeping heart rate in the 120s-130s should be optimal.  3. We'll try to get Saxenda approved:  Start 0.6mg injected daily for a week, then increase to 1.2 mg daily for a week, then 1.8mg daily for a week then 2.4mg injected daily for a week and then finally 3.0 mg injected daily for a week if tolerated. Stop or decrease dose if nausea/vomiting or mid abdominal pain as there is small percent chance of pancreatitis (inflammation of your pancreas).  Stop if any throat swelling/voice change as there is also a small risk of thyroid tumors (seen mostly in mice, not humans).   4. If Saxenda not covered we could consider Contrave if you no longer need Norco for pain as Naltrexone is part of Contrave and it is the antidote to opiates and would make your pain medication not work well.  We can also consider Plenity once available later this year.   5. Recheck with me in about 2 months if starting medication otherwise keep working with Barbara.

## 2021-06-09 NOTE — PATIENT INSTRUCTIONS - HE
Plan:  1. Doing well w/ diet change, given desire to put off surgery for now, focus non surgical weight loss w/ ongoing dietary routines/protein intake and good hydration throughout the day.  2. Continue riding your bike, keeping heart rate in the 120s-130s should be optimal.  3. We'll try to get Saxenda approved:  Start 0.6mg injected daily for a week, then increase to 1.2 mg daily for a week, then 1.8mg daily for a week then 2.4mg injected daily for a week and then finally 3.0 mg injected daily for a week if tolerated. Stop or decrease dose if nausea/vomiting or mid abdominal pain as there is small percent chance of pancreatitis (inflammation of your pancreas).  Stop if any throat swelling/voice change as there is also a small risk of thyroid tumors (seen mostly in mice, not humans).   4. If Saxenda not covered we could consider Contrave if you no longer need Norco for pain as Naltrexone is part of Contrave and it is the antidote to opiates and would make your pain medication not work well.  We can also consider Plenity once available later this year.   5. Recheck with me in about 2 months if starting medication otherwise keep working with Barbara.    MEDICATIONS FOR WEIGHT LOSS  There are several medications available to assist us in weight loss.  By themselves, without compliance to a change in diet and increase in movement/activity these medications are disappointing in their results. However, combined with a closely monitored program of diet change and exercise they can be very effective in controlling appetite and boosting initial weight loss.  All weight loss medications need continual re-evaluation for efficacy as their side effects and health benefits fail to be worthwhile if a person is not continuing to lose weight or in maintaining their healthy weight.  Some weight loss medications are scheduled drugs, meaning there is at least a theoretical possibility for developing addiction to them but in practice  this is rare.  We do anticipate coming off meds in the future after stabilization of weight loss is assurred.  Finally, a tolerance can develop and people s perceived efficacy of medication can diminish.  In communication with your physician, it may be appropriate to intermittently take a break from these medications and then restart again (few weeks off then restart again) if a plateau is reached that cannot be broken through.  Each person can respond to a medication differently and to be a good option for you, it will need to be affordable, effective and well tolerated with minimal side effects.    In most cases, weight loss progress after one month and three months will be obtained and if a patient is not reaching the satisfactory progress towards weight loss, the medications may be discontinued.  The thought is that if a person is taking a weight loss medication and not receiving the potential health benefit of that drug, the side effects are not worthwhile and use should be discontinued.  On the flip side, there are many people on some weight loss medications for years because it continues to be an effective tool in their weight management and they are tolerating the medication without any long-term side effects.  Each person's response and purpose will be evaluated.          Liraglutide (Victoza/Saxenda):   Part of the family of Glucagon Like Peptide Agonists, liraglutide directly suppresses appetite and is often used by diabetic patients due to decrease liver production of glucose, thereby improving glucose levels.  It also slows how quickly the stomach empties. May be hard to get covered for non diabetics and is an injectable medication delivered via a injector pen. It can be very costly without insurance coverage (over $500/month).  Small risk for pancreatitis and dose should be held if increased mid abdominal pain/burning. It is not to be used if previous Multiple Endocrine Neoplasia. In rodents, may  "increase risk of thyroid tumors and not indicated for anyone with hx of medullary thyroid cancer as a result.  If changes in voice/swallowing should be discontinued. Reliable birth control required in women.    Contrave (Bupropion/Naltrexone).    Synergistic combination of a mild appetite suppressing anti-depressant (Bupropion) whose effects are increased due to interaction with Naltrexone.  Naltrexone may have some effects on craving and is often used in addiction medicine to help previous opiate addicts be less prone to relapse as it blocks the action of opiates. Should be stopped if any need for opiate pain medication, surgery or planned procedures where you'll be given sedation/anesthesia. If prolonged use recommend stepping down bupropion over 2-3 weeks to limit any risk of withdrawal issues. Side effects may include dry mouth, increased heart rate, mild elevation in Blood pressure;  dizziness, ringing in the ears, anxiety (typically due to bupropion), nausea, constipation, and some get fatigued with naltrexone.  About $210 on [facility] for 120 tabs of \"Contrave\", the brand name without insurance coverage. Generic Bupropion 75mg: $25 for 120 tabs, Naltrexone: $55 for 90 tabs without insurance coverage on [facility]. Cannot be used if pregnant/trying to conceive or breast feeding.      Plenity:  not yet available.  2-3 capsules taken 20 minutes before 2 meals daily provides crystals that expand into a gel that provides a mechanical fullness. Gel mixes with your next meal and increases satisfaction by bulking the meal up to feel bigger than it truly is. Plenity is not absorbed and gets passed through the digestive tract and excreted in stools. May cause some bloating/gas/full feeling as it behaves like a fiber in many ways. Cost not available yet. FDA cleared in March of 2019 but not available in stores as of March of 2020. Clearance safety and efficacy data done under \"Gelesis\" name. Not appropriate for people " "with stomach or bowel motility issues as requires you to pass it through digestive tract.         Weight Loss Shopping list:    Having the proper food on hand and ready to go is very important in losing weight.  Everybody has their own preferences/tastes so modify this list as you need but the following are foods that have been found to be helpful in following a calorie restricted diet.   If you are a person that doesn't \"like to eat my vegetables\", I recommend making smoothies and throwing the veggies into the  with some fruit and protein powder.      Fruits:  Generally limit to 2-3 whole fruits a day.  Do not buy Juice.  We depend on the fiber and chewing to slow us down and get phytonutrients/antioxidants available in the skins of fruits for health benefits.  Chewing also signals our stomach to send less hunger signals to our brains:    Apples (1-2 daily), Bananas (no more than one full banana a day), Grapes (2 handfuls a day), Clementines/oranges (one daily), berries (blue/rasp/straw:  2 handfuls a day). Watermelon (cubed for easy access, small bowl).    Vegetables:  Eating raw gets most nutrient and fiber benefits but cooked veggies are fine as well, using frozen for cooking or in smoothies is fine as well.  The more chewing/crunchiness the better the hunger control.  No limit to how many a day, but you should at least get 4 handfuls a day or more minimum.  Wash and cut up your vegetables into easy to carry, on the go sizes that are easy to put in plastic bags/pyrex and carry to work/school/etc.  Frozen veggies are just fine as well.     Broccoli, Carrots (no more than 3 a day large carrots or 2 handfuls of baby carrots, as they are very sweet), celery, cucumbers, green peppers, green beans (canned or fresh/frozen), Lettuce(all types), Beets(for roasting, will likely turn urine and stool a bit purple), asparagus.  Go crazy with the veggies, just wash well prior to eating.    Protein:  Women need at least " " grams of protein a day and men at least  grams a day, more is fine.  Protein is our \"brain food\" and hunger suppressor and getting enough ensures maintenance of muscle mass during weight loss.  Vegetarians should look to balance their protein intake to get all essential amino acids and discuss with dietician if help is needed (mix of beans/soy/etc).  Protein powders or drinks count and can be a great way to control appetite during the day and easy to grab and go/carry to work/etc.  Premier Protein, BiPro, TarasWhey are all brands with high quality whey protein. For whey sensitive people, rice protein is an option as well.    Canned tuna/sardines or anchovies.  Fresh fish/salmon the day you plan to make it.  Chicken breasts/thighs (skinless while losing weight), filet mignon steak (leaner but ) or marinade sirloin (wipe off before cooking). Eggs cooked in olive oil for breakfast is a good way to get protein and good fat in the a.m. (cook on low heat to prevent transformation of olive oil into less healthy fat).  Greek Yogurt with at least 20 grams of protein per serving and less than 11 grams of carbs/sugars.  String Cheese  Cottage Cheese: 2% or fat free per your preference.        50 Things to do Instead of Snacking  We'll all have urges to snack sometimes. Hunger, mood, stress, boredom and distraction are common triggers so being mindful and thinking about what is driving a particular urge to snack at a particular time can be helpful for reducing the urge in the future.  Remember, the urge to snack doesn't have to be obeyed. The urge exists, but you can watch it pass. Over time, you will get good at the exercise of experiencing an urge, acknowledging it, but letting it pass you by and float away.  Until you get there, here are some activities to pass the 3-5 minutes that most urges last. Good hydration is always helpful.  If you do struggle with impulse/urge control, consider some therapy " based on cognitive behavioral therapy or ACT (Acceptance and Commitment Therapy).  Apps/subscriptions like [employer]street emphasize some of these tools and can be of help for those able to afford the lynne/subscription.  1. Imagine the new healthier you   2. Walk around the block   3. Call a friend   4. Make a list of your Top Ten Reasons to Lose Weight   5. Make a To Do list   6. Turn on music and dance   7. Jot a thank you note to someone   8. Go to bed early or take a nap  9. Read a book   10. Blog or journal  11. Give yourself a manicure or pedicure   12. Plan a healthy meal for your family   13. Surf the Internet   14. Finish an unfinished project   15. Walk your dog, pet your cat, feed your fish  16. Brush your teeth   17. Balance your checkbook   18. Say a prayer   19. Chop veggies for later use.  20. Give a massage   21. Clean out a junk drawer   22. Play a game with your kids   23. Try a new route on your walk     24. Drink a glass of water   25. Kiss someone   26. Try on some of your clothes   27. Look at old pictures   28. Rent a video   29. Wash your car   30. Take a hot, soothing bath   31. Update your calendar   32. Work in your yard   33. Start your holiday shopping list   34. Count your blessings   35. Write a letter   36. Fold some laundry   37. Check your e-mail   38. Give your dog a bath   39. Send a birthday card   40. Meditate   41. Hug someone   42. Rearrange some furniture   43. Light a fire or some candles   44. Put your pictures in an album   45. Plan a trip (real or imaginary)  46. Straighten a closet   47. Clean out a files   48. Visit a friend  49. Clean out your trunk  50. Do something nice for someone

## 2021-06-09 NOTE — PROGRESS NOTES
"Aleks Gilmore is a 59 y.o. male who is being evaluated via a billable video visit.      The patient has been notified of following:     \"This video visit will be conducted via a call between you and your physician/provider. We have found that certain health care needs can be provided without the need for an in-person physical exam.  This service lets us provide the care you need with a video conversation.  If a prescription is necessary we can send it directly to your pharmacy.  If lab work is needed we can place an order for that and you can then stop by our lab to have the test done at a later time.    Video visits are billed at different rates depending on your insurance coverage. Please reach out to your insurance provider with any questions.    If during the course of the call the physician/provider feels a video visit is not appropriate, you will not be charged for this service.\"    Patient has given verbal consent to a Video visit? Yes  How would you like to obtain your AVS? AVS Preference: [facility].  Will anyone else be joining your video visit? No    Video Start Time: 3:00p    Additional provider notes:      Follow Up Surgical Weight Loss Supervised Diet Evaluation    Assessment:  Pt. is being seen today for a follow up RD nutritional evaluation. Pt. has been unsuccessful with non-surgical weight loss methods and is interested in bariatric surgery. Today we reviewed current eating habits and level of physical activity, and instructed on the changes that are required for successful bariatric outcomes.    Surgery of interest per pt: patient would like to put surgery on hold for now; however, he would like to continue working with me until he decides he would like to continue with the process    Workflow review:  Support Group: Completed.  Psychology:In progress.  Lab work:Completed.  SWL:No     Patient Active Problem List:  Patient Active Problem List   Diagnosis     RBBB     Prostate cancer (H)     " Primary osteoarthritis of right knee     Obesity     Neck pain     Leg pain     Left scapholunate ligament tear     Intervertebral disk disease     Eczema     Chronic anemia     Benign prostatic hyperplasia     Morbid obesity (H)     Esophageal reflux       Pt's Initial Weight: 330 lbs  Weight: (!) 304 lb (137.9 kg)  Weight loss from initial: 26  % Weight loss: 7.88 %    BMI: Body mass index is 43.62 kg/m .    Estimated RMR (Kemp-St Jeor equation): 2220 calories    Progress over past month: has been working on consistency with meal routine and has started incorporating more exercise    Diabetic: No  HbA1c:  No results found for: HGBA1C  Diet Recall/Time:   Breakfast: 1 packet of oatmeal  Lunch: ham and cheese sandwich and 1/2 bag of chips   Dinner: 2 chicken legs and 2 chicken breasts    + Discussed incorporating veggies into routine    Beverages (Type/Oz. per day)  Water: 80oz  Coffee: 20+oz    Exercise  Type: started riding bike last week- 12 miles twice in the past week    PES statement:   1. (NI-1.3)Excessive energy intake related to Food and nutrition related knowledge deficit concerning excessive energy/oral intake as evidenced by Intake of high caloric density foods/beverages (juice, soda, alcohol) at meals and/or snacks; large portions; frequent grazing; Estimated intake that exceeds estimated daily energy intake; Binge eating patterns; Frequent excessive fast food or restaurant intake; and BMI 43.62     Intervention    Food/Nutrient Delivery:  1. Educated pt on eating three meals, with cutting out snacking.  2. Bariatric Plate: Pt and I discussed the importance of including a lean protein source (20-30 grams/meal), vegetables (included at lunch and dinner), one serving (15g) of carbohydrate, and limited added fat (1 tb/day) at each meal.   3.   Instructions/Goals:     1. 2-3 times per week ride bike   2. Add in vegetable at lunch       Monitor/Evaluation:  Pt. s target weight:  no gain from initial  visit, pt. verbalized understanding.     Plan for next visit:   Follow up with RD in 1 month      Video-Visit Details    Type of service:  Video Visit    Video End Time (time video stopped): 3:18 PM  Originating Location (pt. Location): Home    Distant Location (provider location):  [facility]     Platform used for Video Visit: Alfred Deleon RD

## 2021-08-08 ENCOUNTER — HEALTH MAINTENANCE LETTER (OUTPATIENT)
Age: 61
End: 2021-08-08

## 2021-10-03 ENCOUNTER — HEALTH MAINTENANCE LETTER (OUTPATIENT)
Age: 61
End: 2021-10-03

## 2022-09-04 ENCOUNTER — HEALTH MAINTENANCE LETTER (OUTPATIENT)
Age: 62
End: 2022-09-04

## 2023-10-01 ENCOUNTER — HEALTH MAINTENANCE LETTER (OUTPATIENT)
Age: 63
End: 2023-10-01

## 2023-10-31 ENCOUNTER — VIRTUAL VISIT (OUTPATIENT)
Dept: INTERNAL MEDICINE | Facility: CLINIC | Age: 63
End: 2023-10-31
Payer: COMMERCIAL

## 2023-10-31 DIAGNOSIS — E66.9 OBESITY, UNSPECIFIED CLASSIFICATION, UNSPECIFIED OBESITY TYPE, UNSPECIFIED WHETHER SERIOUS COMORBIDITY PRESENT: Primary | ICD-10-CM

## 2023-10-31 PROCEDURE — 99207 PR NON-BILLABLE SERV PER CHARTING: CPT | Mod: VID | Performed by: NURSE PRACTITIONER

## 2023-10-31 RX ORDER — CYCLOBENZAPRINE HCL 5 MG
5 TABLET ORAL 2 TIMES DAILY PRN
COMMUNITY
Start: 2023-09-25

## 2023-10-31 RX ORDER — ATORVASTATIN CALCIUM 40 MG/1
1 TABLET, FILM COATED ORAL AT BEDTIME
COMMUNITY
Start: 2023-01-14

## 2023-10-31 RX ORDER — NITROGLYCERIN 0.4 MG/1
0.4 TABLET SUBLINGUAL EVERY 5 MIN PRN
COMMUNITY
Start: 2022-01-11

## 2023-10-31 RX ORDER — ASPIRIN 81 MG/1
81 TABLET ORAL DAILY
COMMUNITY

## 2023-10-31 RX ORDER — AZELASTINE 1 MG/ML
2 SPRAY, METERED NASAL 2 TIMES DAILY
COMMUNITY
Start: 2023-07-04

## 2023-10-31 NOTE — PROGRESS NOTES
I apologized to Oswald for the scheduling error.  I told him that I am only excepting patients within the [facility] system.  He has been a long time patient of [facility].  I will have my team reach out to him via [employer] with a list of providers that are available to establish care.  No charge visit

## 2023-12-10 ENCOUNTER — HEALTH MAINTENANCE LETTER (OUTPATIENT)
Age: 63
End: 2023-12-10

## 2023-12-20 ENCOUNTER — TRANSFERRED RECORDS (OUTPATIENT)
Dept: HEALTH INFORMATION MANAGEMENT | Facility: CLINIC | Age: 63
End: 2023-12-20
Payer: COMMERCIAL

## 2024-06-30 ENCOUNTER — HOSPITAL ENCOUNTER (EMERGENCY)
Facility: CLINIC | Age: 64
Discharge: HOME | End: 2024-06-30
Attending: EMERGENCY MEDICINE | Admitting: EMERGENCY MEDICINE
Payer: COMMERCIAL

## 2024-06-30 VITALS
HEART RATE: 76 BPM | RESPIRATION RATE: 16 BRPM | DIASTOLIC BLOOD PRESSURE: 101 MMHG | BODY MASS INDEX: 42.8 KG/M2 | SYSTOLIC BLOOD PRESSURE: 151 MMHG | WEIGHT: 299 LBS | HEIGHT: 70 IN | OXYGEN SATURATION: 99 % | TEMPERATURE: 96.8 F

## 2024-06-30 DIAGNOSIS — S16.1XXA CERVICAL STRAIN, INITIAL ENCOUNTER: ICD-10-CM

## 2024-06-30 DIAGNOSIS — M54.12 CERVICAL RADICULOPATHY: ICD-10-CM

## 2024-06-30 DIAGNOSIS — S86.911A KNEE STRAIN, RIGHT, INITIAL ENCOUNTER: ICD-10-CM

## 2024-06-30 PROCEDURE — 99283 EMERGENCY DEPT VISIT LOW MDM: CPT

## 2024-06-30 RX ORDER — METHYLPREDNISOLONE 4 MG
TABLET, DOSE PACK ORAL
Qty: 21 TABLET | Refills: 0 | Status: SHIPPED | OUTPATIENT
Start: 2024-06-30

## 2024-06-30 ASSESSMENT — ACTIVITIES OF DAILY LIVING (ADL): ADLS_ACUITY_SCORE: 35

## 2024-06-30 ASSESSMENT — COLUMBIA-SUICIDE SEVERITY RATING SCALE - C-SSRS
2. HAVE YOU ACTUALLY HAD ANY THOUGHTS OF KILLING YOURSELF IN THE PAST MONTH?: NO
6. HAVE YOU EVER DONE ANYTHING, STARTED TO DO ANYTHING, OR PREPARED TO DO ANYTHING TO END YOUR LIFE?: NO
1. IN THE PAST MONTH, HAVE YOU WISHED YOU WERE DEAD OR WISHED YOU COULD GO TO SLEEP AND NOT WAKE UP?: NO
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
## 2024-06-30 NOTE — DISCHARGE INSTRUCTIONS
As discussed, you have likely exacerbated your chronic neck pain with your pinched nerve.  Return to using your physical therapy exercises.  Follow-up with your primary doc and physical therapist this week.  Use the prescribed Medrol Dosepak along with your Vicodin and muscle relaxers.  Never hesitate to return to the ER for worsening of your numbness/weakness or other emergent concerns.    Discharge Instructions  Neck Strain    You have been seen today for a neck sprain or strain.  Neck strains usually result from an injury to the neck. Car accidents, contact sports, and falls are common causes of neck strain. Sometimes your neck can start to hurt because of increased activity, muscle tension, an abnormal sleeping position, or because of other problems like arthritis in the neck.     Neck pain usually comes from injured muscles and ligaments. Sometimes there is a herniated ( slipped ) disc. We do not usually do MRI scans to look for these right away, since most herniated discs will get better on their own with time. Today, we did not find any evidence that your neck pain was caused by a serious or dangerous condition. However, sometimes symptoms develop over time and cannot be found during an emergency visit, so it is very important that you follow up with your primary provider.    Generally, every Emergency Department visit should have a follow-up clinic visit with either a primary or a specialty clinic/provider. Please follow-up as instructed by your emergency provider today.    Return to the Emergency Department if:  You have increasing pain in your neck.  You develop difficulty swallowing or breathing.  You have numbness, weakness, or trouble moving your arms or legs.  You have severe dizziness and difficulty walking.  You are unable to control your bladder or bowels.  You develop severe headache or ringing in the ears.    What can I do to help myself at home?  If you had an injury, use cold for the first 1-2  days. Cold helps relieve pain and reduce inflammation.  Apply ice packs to the neck or areas of pain every 1-2 hours for 20 minutes at a time. Place a towel or cloth between your skin and the ice pack.  After the first 2 days, using heat can help with neck pain and stiffness. You may use a warm shower or bath, warm towels on the neck, or a heating pad. Do not sleep with a heating pad, as you can be burned.   Pain medications - You may take a pain medication such as Tylenol  (acetaminophen), Advil  and Motrin  (ibuprofen), or Aleve  (naproxen).  It is usually best to rest the neck for 1-2 days after an injury, then start gentle stretching exercises.   It is helpful to place a small pillow under the nape of your neck to provide proper neutral positioning.   You should stay active and do your usual work as much as you can, unless this involves heavy physical labor. Ask your provider if you need work restrictions.  If you were given a prescription for medicine here today, be sure to read all of the information (including the package insert) that comes with your prescription.  This will include important information about the medicine, its side effects, and any warnings that you need to know about.  The pharmacist who fills the prescription can provide more information and answer questions you may have about the medicine.  If you have questions or concerns that the pharmacist cannot address, please call or return to the Emergency Department.   Remember that you can always come back to the Emergency Department if you are not able to see your regular provider in the amount of time listed above, if you get any new symptoms, or if there is anything that worries you.
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
## 2025-02-09 ENCOUNTER — HEALTH MAINTENANCE LETTER (OUTPATIENT)
Age: 65
End: 2025-02-09